# Patient Record
Sex: MALE | Race: WHITE | Employment: UNEMPLOYED | URBAN - METROPOLITAN AREA
[De-identification: names, ages, dates, MRNs, and addresses within clinical notes are randomized per-mention and may not be internally consistent; named-entity substitution may affect disease eponyms.]

---

## 2020-10-05 ENCOUNTER — HOSPITAL ENCOUNTER (INPATIENT)
Age: 35
LOS: 1 days | Discharge: PSYCHIATRIC OR SUBSTANCE USE FACILITY | DRG: 201 | End: 2020-10-07
Attending: EMERGENCY MEDICINE | Admitting: INTERNAL MEDICINE
Payer: COMMERCIAL

## 2020-10-05 LAB
ACETAMINOPHEN LEVEL: <5 UG/ML (ref 10–30)
ALBUMIN SERPL-MCNC: 4.6 G/DL (ref 3.5–4.6)
ALP BLD-CCNC: 82 U/L (ref 35–104)
ALT SERPL-CCNC: 141 U/L (ref 0–41)
AMPHETAMINE SCREEN, URINE: ABNORMAL
ANION GAP SERPL CALCULATED.3IONS-SCNC: 11 MEQ/L (ref 9–15)
AST SERPL-CCNC: 267 U/L (ref 0–40)
BARBITURATE SCREEN URINE: ABNORMAL
BASOPHILS ABSOLUTE: 0 K/UL (ref 0–0.2)
BASOPHILS RELATIVE PERCENT: 0.6 %
BENZODIAZEPINE SCREEN, URINE: ABNORMAL
BILIRUB SERPL-MCNC: 0.9 MG/DL (ref 0.2–0.7)
BILIRUBIN URINE: ABNORMAL
BLOOD, URINE: NEGATIVE
BUN BLDV-MCNC: 9 MG/DL (ref 6–20)
CALCIUM SERPL-MCNC: 9.2 MG/DL (ref 8.5–9.9)
CANNABINOID SCREEN URINE: ABNORMAL
CHLORIDE BLD-SCNC: 101 MEQ/L (ref 95–107)
CHOLESTEROL, TOTAL: 185 MG/DL (ref 0–199)
CK MB: 7.4 NG/ML (ref 0–6.7)
CLARITY: CLEAR
CO2: 25 MEQ/L (ref 20–31)
COCAINE METABOLITE SCREEN URINE: POSITIVE
COLOR: ABNORMAL
CREAT SERPL-MCNC: 0.68 MG/DL (ref 0.7–1.2)
CREATINE KINASE-MB INDEX: 0.8 % (ref 0–3.5)
EOSINOPHILS ABSOLUTE: 0 K/UL (ref 0–0.7)
EOSINOPHILS RELATIVE PERCENT: 0.5 %
ETHANOL PERCENT: 0.07 G/DL
ETHANOL: 81 MG/DL (ref 0–0.08)
GFR AFRICAN AMERICAN: >60
GFR NON-AFRICAN AMERICAN: >60
GLOBULIN: 3.4 G/DL (ref 2.3–3.5)
GLUCOSE BLD-MCNC: 101 MG/DL (ref 70–99)
GLUCOSE URINE: NEGATIVE MG/DL
HCT VFR BLD CALC: 44.3 % (ref 42–52)
HDLC SERPL-MCNC: 64 MG/DL (ref 40–59)
HEMOGLOBIN: 14.8 G/DL (ref 14–18)
KETONES, URINE: ABNORMAL MG/DL
LDL CHOLESTEROL CALCULATED: 100 MG/DL (ref 0–129)
LEUKOCYTE ESTERASE, URINE: NEGATIVE
LYMPHOCYTES ABSOLUTE: 2.3 K/UL (ref 1–4.8)
LYMPHOCYTES RELATIVE PERCENT: 36.1 %
Lab: ABNORMAL
MCH RBC QN AUTO: 33.2 PG (ref 27–31.3)
MCHC RBC AUTO-ENTMCNC: 33.5 % (ref 33–37)
MCV RBC AUTO: 99 FL (ref 80–100)
METHADONE SCREEN, URINE: ABNORMAL
MONOCYTES ABSOLUTE: 0.6 K/UL (ref 0.2–0.8)
MONOCYTES RELATIVE PERCENT: 9.7 %
NEUTROPHILS ABSOLUTE: 3.4 K/UL (ref 1.4–6.5)
NEUTROPHILS RELATIVE PERCENT: 53.1 %
NITRITE, URINE: NEGATIVE
OPIATE SCREEN URINE: ABNORMAL
OXYCODONE URINE: ABNORMAL
PDW BLD-RTO: 13.9 % (ref 11.5–14.5)
PH UA: 6 (ref 5–9)
PHENCYCLIDINE SCREEN URINE: ABNORMAL
PLATELET # BLD: 322 K/UL (ref 130–400)
POTASSIUM SERPL-SCNC: 4.2 MEQ/L (ref 3.4–4.9)
PROPOXYPHENE SCREEN: ABNORMAL
PROTEIN UA: ABNORMAL MG/DL
RBC # BLD: 4.48 M/UL (ref 4.7–6.1)
SALICYLATE, SERUM: <0.3 MG/DL (ref 15–30)
SODIUM BLD-SCNC: 137 MEQ/L (ref 135–144)
SPECIFIC GRAVITY UA: 1.02 (ref 1–1.03)
TOTAL CK: 899 U/L (ref 0–190)
TOTAL PROTEIN: 8 G/DL (ref 6.3–8)
TRIGL SERPL-MCNC: 104 MG/DL (ref 0–150)
TSH SERPL DL<=0.05 MIU/L-ACNC: 1.89 UIU/ML (ref 0.44–3.86)
URINE REFLEX TO CULTURE: ABNORMAL
UROBILINOGEN, URINE: 1 E.U./DL
WBC # BLD: 6.4 K/UL (ref 4.8–10.8)

## 2020-10-05 PROCEDURE — 36415 COLL VENOUS BLD VENIPUNCTURE: CPT

## 2020-10-05 PROCEDURE — 81003 URINALYSIS AUTO W/O SCOPE: CPT

## 2020-10-05 PROCEDURE — G0480 DRUG TEST DEF 1-7 CLASSES: HCPCS

## 2020-10-05 PROCEDURE — 80061 LIPID PANEL: CPT

## 2020-10-05 PROCEDURE — 80307 DRUG TEST PRSMV CHEM ANLYZR: CPT

## 2020-10-05 PROCEDURE — 85025 COMPLETE CBC W/AUTO DIFF WBC: CPT

## 2020-10-05 PROCEDURE — 99285 EMERGENCY DEPT VISIT HI MDM: CPT

## 2020-10-05 PROCEDURE — 84443 ASSAY THYROID STIM HORMONE: CPT

## 2020-10-05 PROCEDURE — 93005 ELECTROCARDIOGRAM TRACING: CPT | Performed by: PHYSICIAN ASSISTANT

## 2020-10-05 PROCEDURE — 6370000000 HC RX 637 (ALT 250 FOR IP): Performed by: EMERGENCY MEDICINE

## 2020-10-05 PROCEDURE — 82550 ASSAY OF CK (CPK): CPT

## 2020-10-05 PROCEDURE — 82553 CREATINE MB FRACTION: CPT

## 2020-10-05 PROCEDURE — 80053 COMPREHEN METABOLIC PANEL: CPT

## 2020-10-05 RX ORDER — PRAZOSIN HYDROCHLORIDE 2 MG/1
2 CAPSULE ORAL NIGHTLY
Status: ON HOLD | COMMUNITY
End: 2020-10-14 | Stop reason: HOSPADM

## 2020-10-05 RX ORDER — LORAZEPAM 1 MG/1
2 TABLET ORAL ONCE
Status: COMPLETED | OUTPATIENT
Start: 2020-10-05 | End: 2020-10-05

## 2020-10-05 RX ORDER — GABAPENTIN 600 MG/1
400 TABLET ORAL 3 TIMES DAILY
Status: ON HOLD | COMMUNITY
End: 2022-01-20 | Stop reason: HOSPADM

## 2020-10-05 RX ORDER — BENZTROPINE MESYLATE 0.5 MG/1
0.5 TABLET ORAL DAILY
Status: ON HOLD | COMMUNITY
End: 2020-10-06

## 2020-10-05 RX ADMIN — LORAZEPAM 2 MG: 1 TABLET ORAL at 21:58

## 2020-10-05 ASSESSMENT — PAIN DESCRIPTION - FREQUENCY: FREQUENCY: CONTINUOUS

## 2020-10-05 ASSESSMENT — ENCOUNTER SYMPTOMS
DIARRHEA: 0
SHORTNESS OF BREATH: 0
BACK PAIN: 0
COUGH: 0
STRIDOR: 0
WHEEZING: 0
VOMITING: 0
ABDOMINAL PAIN: 0
NAUSEA: 0

## 2020-10-05 ASSESSMENT — PAIN DESCRIPTION - DESCRIPTORS: DESCRIPTORS: ACHING

## 2020-10-05 ASSESSMENT — PAIN DESCRIPTION - PAIN TYPE: TYPE: CHRONIC PAIN

## 2020-10-05 ASSESSMENT — PAIN DESCRIPTION - LOCATION: LOCATION: BACK

## 2020-10-05 ASSESSMENT — PAIN SCALES - GENERAL: PAINLEVEL_OUTOF10: 6

## 2020-10-05 ASSESSMENT — PATIENT HEALTH QUESTIONNAIRE - PHQ9: SUM OF ALL RESPONSES TO PHQ QUESTIONS 1-9: 27

## 2020-10-05 NOTE — ED PROVIDER NOTES
confusion, hallucinations and self-injury. The patient is not nervous/anxious. All other systems reviewed and are negative. Except as noted above the remainder of the review of systems was reviewed and negative. PAST MEDICAL HISTORY     Past Medical History:   Diagnosis Date    Neuropathy          SURGICALHISTORY     No past surgical history on file. CURRENT MEDICATIONS       Previous Medications    BENZTROPINE (COGENTIN) 0.5 MG TABLET    Take 0.5 mg by mouth daily    CARIPRAZINE HCL (VRAYLAR) 1.5 MG CAPSULE    Take 1.5 mg by mouth daily    GABAPENTIN (NEURONTIN) 600 MG TABLET    Take 600 mg by mouth 3 times daily. PRAZOSIN (MINIPRESS) 2 MG CAPSULE    Take 2 mg by mouth nightly       ALLERGIES     Quetiapine and Penicillins    FAMILY HISTORY     No family history on file.        SOCIAL HISTORY       Social History     Socioeconomic History    Marital status: Single     Spouse name: Not on file    Number of children: Not on file    Years of education: Not on file    Highest education level: Not on file   Occupational History    Not on file   Social Needs    Financial resource strain: Not on file    Food insecurity     Worry: Not on file     Inability: Not on file    Transportation needs     Medical: Not on file     Non-medical: Not on file   Tobacco Use    Smoking status: Current Some Day Smoker     Types: Cigarettes    Smokeless tobacco: Never Used   Substance and Sexual Activity    Alcohol use: Yes     Comment: A 5th a day    Drug use: Not on file    Sexual activity: Not on file   Lifestyle    Physical activity     Days per week: Not on file     Minutes per session: Not on file    Stress: Not on file   Relationships    Social connections     Talks on phone: Not on file     Gets together: Not on file     Attends Cheondoism service: Not on file     Active member of club or organization: Not on file     Attends meetings of clubs or organizations: Not on file     Relationship status: Not on file    Intimate partner violence     Fear of current or ex partner: Not on file     Emotionally abused: Not on file     Physically abused: Not on file     Forced sexual activity: Not on file   Other Topics Concern    Not on file   Social History Narrative    Not on file       SCREENINGS      @MDED([id number])@      PHYSICAL EXAM    (up to 7 for level 4, 8 or more for level 5)     ED Triage Vitals [10/05/20 1911]   BP Temp Temp Source Pulse Resp SpO2 Height Weight   122/82 98.3 °F (36.8 °C) Temporal 87 18 96 % 5' 11\" (1.803 m) 160 lb (72.6 kg)       Physical Exam  Constitutional:       Appearance: He is well-developed. HENT:      Head: Normocephalic and atraumatic. Nose: Nose normal.      Mouth/Throat:      Mouth: Mucous membranes are moist.   Eyes:      Extraocular Movements: Extraocular movements intact. Conjunctiva/sclera: Conjunctivae normal.      Pupils: Pupils are equal, round, and reactive to light. Neck:      Musculoskeletal: Normal range of motion and neck supple. Cardiovascular:      Rate and Rhythm: Normal rate and regular rhythm. Heart sounds: Normal heart sounds. No murmur. Pulmonary:      Effort: Pulmonary effort is normal. No respiratory distress. Breath sounds: Normal breath sounds. No wheezing. Abdominal:      General: There is no distension. Palpations: Abdomen is soft. Tenderness: There is no abdominal tenderness. Musculoskeletal: Normal range of motion. General: No tenderness. Skin:     General: Skin is warm. Findings: No rash. Neurological:      General: No focal deficit present. Mental Status: He is alert and oriented to person, place, and time. Comments: Grossly unremarkable without lateralizing signs. Normal strength and sensation.   GCS 15.   Psychiatric:         Behavior: Behavior normal.         DIAGNOSTIC RESULTS     EKG: All EKG's are interpreted by the Emergency Department Physician who either signs or Co-signsthis chart in the absence of a cardiologist.    EKG#1  shows sinus rhythm with a rate of 73, nonspecific ST and T wave changes, no acute ischemic changes or arrhythmia. EKG #2: Sinus rhythm at 65 bpm, T wave inversions in the inferior anterior lateral leads, diffuse low voltage, QT intervals 464 ms, QTC is 482 ms, no PVCs. Similar appearance to prior EKG done today. EKG 3: Sinus bradycardia 51 bpm, T wave inversions in the inferior and anterior lateral leads, QT interval is 558 ms, QTC is 514 ms, no PVCs. EKG #4: Sinus bradycardia at 42 bpm, T wave inversions in the inferior, and anterior lateral leads, QT interval of 558 ms, QTC of 465 ms, no PVCs. RADIOLOGY:   Non-plain filmimages such as CT, Ultrasound and MRI are read by the radiologist. Plain radiographic images are visualized and preliminarily interpreted by the emergency physician with the below findings:    n/a    Interpretation per the Radiologist below, if available at the time ofthis note:    No orders to display         ED BEDSIDE ULTRASOUND:   Performed by ED Physician - none    LABS:  Labs Reviewed   ACETAMINOPHEN LEVEL - Abnormal; Notable for the following components:       Result Value    Acetaminophen Level <5 (*)     All other components within normal limits   CBC WITH AUTO DIFFERENTIAL - Abnormal; Notable for the following components:    RBC 4.48 (*)     MCH 33.2 (*)     All other components within normal limits   CK - Abnormal; Notable for the following components:     Total  (*)     All other components within normal limits   COMPREHENSIVE METABOLIC PANEL - Abnormal; Notable for the following components:    Glucose 101 (*)     CREATININE 0.68 (*)     Total Bilirubin 0.9 (*)      (*)      (*)     All other components within normal limits   LIPID PANEL - Abnormal; Notable for the following components:    HDL 64 (*)     All other components within normal limits   SALICYLATE LEVEL - Abnormal; Notable for the following components:    Salicylate, Serum <2.0 (*)     All other components within normal limits   URINE DRUG SCREEN - Abnormal; Notable for the following components:    Cocaine Metabolite Screen, Urine POSITIVE (*)     All other components within normal limits   URINE RT REFLEX TO CULTURE - Abnormal; Notable for the following components:    Color, UA DARK YELLOW (*)     Bilirubin Urine SMALL (*)     Ketones, Urine TRACE (*)     Protein, UA TRACE (*)     All other components within normal limits   CKMB & RELATIVE PERCENT - Abnormal; Notable for the following components:    CK-MB 7.4 (*)     All other components within normal limits   HEPATIC FUNCTION PANEL - Abnormal; Notable for the following components:     (*)      (*)     All other components within normal limits   CK - Abnormal; Notable for the following components: Total  (*)     All other components within normal limits   CK - Abnormal; Notable for the following components: Total  (*)     All other components within normal limits   BASIC METABOLIC PANEL - Abnormal; Notable for the following components:    Anion Gap 8 (*)     Glucose 114 (*)     CREATININE 0.66 (*)     All other components within normal limits   ETHANOL   TSH WITHOUT REFLEX   CKMB & RELATIVE PERCENT   COVID-19   TROPONIN   MAGNESIUM   CKMB & RELATIVE PERCENT       All other labs were within normal range or not returned as of this dictation. EMERGENCY DEPARTMENT COURSE and DIFFERENTIAL DIAGNOSIS/MDM:   Vitals:    Vitals:    10/06/20 1153 10/06/20 1218 10/06/20 1404 10/06/20 1407   BP: 123/82 114/64 122/76 122/76   Pulse: (!) 45 (!) 46 57 57   Resp: 16      Temp:       TempSrc:       SpO2:       Weight:       Height:           MDM  Number of Diagnoses or Management Options  Diagnosis management comments: Medical screening evaluation was ordered. Blood work overall unremarkable other than slightly elevated CK.   EKG shows sinus rhythm with a rate of 73,

## 2020-10-06 PROBLEM — R94.31 QT PROLONGATION: Status: ACTIVE | Noted: 2020-10-06

## 2020-10-06 LAB
ALBUMIN SERPL-MCNC: 4.1 G/DL (ref 3.5–4.6)
ALP BLD-CCNC: 73 U/L (ref 35–104)
ALT SERPL-CCNC: 112 U/L (ref 0–41)
ANION GAP SERPL CALCULATED.3IONS-SCNC: 8 MEQ/L (ref 9–15)
AST SERPL-CCNC: 176 U/L (ref 0–40)
BASOPHILS ABSOLUTE: 0 K/UL (ref 0–0.2)
BASOPHILS RELATIVE PERCENT: 0.5 %
BILIRUB SERPL-MCNC: 0.7 MG/DL (ref 0.2–0.7)
BILIRUBIN DIRECT: <0.2 MG/DL (ref 0–0.4)
BILIRUBIN, INDIRECT: ABNORMAL MG/DL (ref 0–0.6)
BUN BLDV-MCNC: 10 MG/DL (ref 6–20)
CALCIUM SERPL-MCNC: 9.3 MG/DL (ref 8.5–9.9)
CHLORIDE BLD-SCNC: 102 MEQ/L (ref 95–107)
CK MB: 2.7 NG/ML (ref 0–6.7)
CK MB: 4.7 NG/ML (ref 0–6.7)
CO2: 29 MEQ/L (ref 20–31)
CREAT SERPL-MCNC: 0.66 MG/DL (ref 0.7–1.2)
CREATINE KINASE-MB INDEX: 0.6 % (ref 0–3.5)
CREATINE KINASE-MB INDEX: 0.8 % (ref 0–3.5)
EKG ATRIAL RATE: 42 BPM
EKG ATRIAL RATE: 51 BPM
EKG ATRIAL RATE: 56 BPM
EKG ATRIAL RATE: 65 BPM
EKG ATRIAL RATE: 73 BPM
EKG P AXIS: 52 DEGREES
EKG P AXIS: 57 DEGREES
EKG P AXIS: 57 DEGREES
EKG P AXIS: 59 DEGREES
EKG P AXIS: 61 DEGREES
EKG P-R INTERVAL: 144 MS
EKG P-R INTERVAL: 146 MS
EKG P-R INTERVAL: 146 MS
EKG P-R INTERVAL: 150 MS
EKG P-R INTERVAL: 154 MS
EKG Q-T INTERVAL: 446 MS
EKG Q-T INTERVAL: 454 MS
EKG Q-T INTERVAL: 464 MS
EKG Q-T INTERVAL: 558 MS
EKG Q-T INTERVAL: 558 MS
EKG QRS DURATION: 100 MS
EKG QRS DURATION: 102 MS
EKG QRS DURATION: 108 MS
EKG QRS DURATION: 94 MS
EKG QRS DURATION: 98 MS
EKG QTC CALCULATION (BAZETT): 430 MS
EKG QTC CALCULATION (BAZETT): 465 MS
EKG QTC CALCULATION (BAZETT): 482 MS
EKG QTC CALCULATION (BAZETT): 500 MS
EKG QTC CALCULATION (BAZETT): 514 MS
EKG R AXIS: -68 DEGREES
EKG R AXIS: -71 DEGREES
EKG R AXIS: -76 DEGREES
EKG R AXIS: 256 DEGREES
EKG R AXIS: 265 DEGREES
EKG T AXIS: -13 DEGREES
EKG T AXIS: -72 DEGREES
EKG T AXIS: -83 DEGREES
EKG T AXIS: 23 DEGREES
EKG T AXIS: 59 DEGREES
EKG VENTRICULAR RATE: 42 BPM
EKG VENTRICULAR RATE: 51 BPM
EKG VENTRICULAR RATE: 56 BPM
EKG VENTRICULAR RATE: 65 BPM
EKG VENTRICULAR RATE: 73 BPM
EOSINOPHILS ABSOLUTE: 0 K/UL (ref 0–0.7)
EOSINOPHILS RELATIVE PERCENT: 0.2 %
GFR AFRICAN AMERICAN: >60
GFR NON-AFRICAN AMERICAN: >60
GLUCOSE BLD-MCNC: 114 MG/DL (ref 70–99)
HCT VFR BLD CALC: 43.6 % (ref 42–52)
HEMOGLOBIN: 14.6 G/DL (ref 14–18)
HEPATITIS C ANTIBODY INTERPRETATION: REACTIVE
LYMPHOCYTES ABSOLUTE: 1.1 K/UL (ref 1–4.8)
LYMPHOCYTES RELATIVE PERCENT: 21.7 %
MAGNESIUM: 1.8 MG/DL (ref 1.7–2.4)
MCH RBC QN AUTO: 33.3 PG (ref 27–31.3)
MCHC RBC AUTO-ENTMCNC: 33.4 % (ref 33–37)
MCV RBC AUTO: 99.9 FL (ref 80–100)
MONOCYTES ABSOLUTE: 0.5 K/UL (ref 0.2–0.8)
MONOCYTES RELATIVE PERCENT: 9.6 %
NEUTROPHILS ABSOLUTE: 3.5 K/UL (ref 1.4–6.5)
NEUTROPHILS RELATIVE PERCENT: 68 %
PDW BLD-RTO: 13.7 % (ref 11.5–14.5)
PLATELET # BLD: 289 K/UL (ref 130–400)
POTASSIUM SERPL-SCNC: 4.1 MEQ/L (ref 3.4–4.9)
RBC # BLD: 4.37 M/UL (ref 4.7–6.1)
SARS-COV-2, NAAT: NOT DETECTED
SODIUM BLD-SCNC: 139 MEQ/L (ref 135–144)
TOTAL CK: 420 U/L (ref 0–190)
TOTAL CK: 621 U/L (ref 0–190)
TOTAL PROTEIN: 6.9 G/DL (ref 6.3–8)
TROPONIN: <0.01 NG/ML (ref 0–0.01)
WBC # BLD: 5.1 K/UL (ref 4.8–10.8)

## 2020-10-06 PROCEDURE — 86803 HEPATITIS C AB TEST: CPT

## 2020-10-06 PROCEDURE — 82553 CREATINE MB FRACTION: CPT

## 2020-10-06 PROCEDURE — 99255 IP/OBS CONSLTJ NEW/EST HI 80: CPT | Performed by: INTERNAL MEDICINE

## 2020-10-06 PROCEDURE — 93005 ELECTROCARDIOGRAM TRACING: CPT | Performed by: STUDENT IN AN ORGANIZED HEALTH CARE EDUCATION/TRAINING PROGRAM

## 2020-10-06 PROCEDURE — 36415 COLL VENOUS BLD VENIPUNCTURE: CPT

## 2020-10-06 PROCEDURE — 6370000000 HC RX 637 (ALT 250 FOR IP): Performed by: INTERNAL MEDICINE

## 2020-10-06 PROCEDURE — 93010 ELECTROCARDIOGRAM REPORT: CPT | Performed by: INTERNAL MEDICINE

## 2020-10-06 PROCEDURE — 80048 BASIC METABOLIC PNL TOTAL CA: CPT

## 2020-10-06 PROCEDURE — 83735 ASSAY OF MAGNESIUM: CPT

## 2020-10-06 PROCEDURE — 84484 ASSAY OF TROPONIN QUANT: CPT

## 2020-10-06 PROCEDURE — 6360000002 HC RX W HCPCS: Performed by: INTERNAL MEDICINE

## 2020-10-06 PROCEDURE — G0378 HOSPITAL OBSERVATION PER HR: HCPCS

## 2020-10-06 PROCEDURE — 1210000000 HC MED SURG R&B

## 2020-10-06 PROCEDURE — 85025 COMPLETE CBC W/AUTO DIFF WBC: CPT

## 2020-10-06 PROCEDURE — 6370000000 HC RX 637 (ALT 250 FOR IP): Performed by: STUDENT IN AN ORGANIZED HEALTH CARE EDUCATION/TRAINING PROGRAM

## 2020-10-06 PROCEDURE — 2580000003 HC RX 258: Performed by: INTERNAL MEDICINE

## 2020-10-06 PROCEDURE — 80076 HEPATIC FUNCTION PANEL: CPT

## 2020-10-06 PROCEDURE — U0002 COVID-19 LAB TEST NON-CDC: HCPCS

## 2020-10-06 PROCEDURE — 82550 ASSAY OF CK (CPK): CPT

## 2020-10-06 RX ORDER — SODIUM CHLORIDE 0.9 % (FLUSH) 0.9 %
10 SYRINGE (ML) INJECTION EVERY 12 HOURS SCHEDULED
Status: DISCONTINUED | OUTPATIENT
Start: 2020-10-06 | End: 2020-10-07 | Stop reason: HOSPADM

## 2020-10-06 RX ORDER — ACETAMINOPHEN 325 MG/1
650 TABLET ORAL EVERY 6 HOURS PRN
Status: DISCONTINUED | OUTPATIENT
Start: 2020-10-06 | End: 2020-10-07 | Stop reason: HOSPADM

## 2020-10-06 RX ORDER — TRAMADOL HYDROCHLORIDE 50 MG/1
50 TABLET ORAL ONCE
Status: COMPLETED | OUTPATIENT
Start: 2020-10-06 | End: 2020-10-06

## 2020-10-06 RX ORDER — LORAZEPAM 2 MG/ML
2 INJECTION INTRAMUSCULAR
Status: DISCONTINUED | OUTPATIENT
Start: 2020-10-06 | End: 2020-10-07 | Stop reason: HOSPADM

## 2020-10-06 RX ORDER — LORAZEPAM 1 MG/1
2 TABLET ORAL
Status: DISCONTINUED | OUTPATIENT
Start: 2020-10-06 | End: 2020-10-07 | Stop reason: HOSPADM

## 2020-10-06 RX ORDER — LORAZEPAM 1 MG/1
3 TABLET ORAL
Status: DISCONTINUED | OUTPATIENT
Start: 2020-10-06 | End: 2020-10-07 | Stop reason: HOSPADM

## 2020-10-06 RX ORDER — LORAZEPAM 2 MG/ML
3 INJECTION INTRAMUSCULAR
Status: DISCONTINUED | OUTPATIENT
Start: 2020-10-06 | End: 2020-10-07 | Stop reason: HOSPADM

## 2020-10-06 RX ORDER — LORAZEPAM 2 MG/ML
1 INJECTION INTRAMUSCULAR
Status: DISCONTINUED | OUTPATIENT
Start: 2020-10-06 | End: 2020-10-07 | Stop reason: HOSPADM

## 2020-10-06 RX ORDER — ONDANSETRON 2 MG/ML
4 INJECTION INTRAMUSCULAR; INTRAVENOUS EVERY 6 HOURS PRN
Status: DISCONTINUED | OUTPATIENT
Start: 2020-10-06 | End: 2020-10-07 | Stop reason: HOSPADM

## 2020-10-06 RX ORDER — LORAZEPAM 1 MG/1
1 TABLET ORAL
Status: DISCONTINUED | OUTPATIENT
Start: 2020-10-06 | End: 2020-10-07 | Stop reason: HOSPADM

## 2020-10-06 RX ORDER — GABAPENTIN 300 MG/1
600 CAPSULE ORAL 3 TIMES DAILY
Status: DISCONTINUED | OUTPATIENT
Start: 2020-10-06 | End: 2020-10-07 | Stop reason: HOSPADM

## 2020-10-06 RX ORDER — LORAZEPAM 2 MG/ML
4 INJECTION INTRAMUSCULAR
Status: DISCONTINUED | OUTPATIENT
Start: 2020-10-06 | End: 2020-10-07 | Stop reason: HOSPADM

## 2020-10-06 RX ORDER — PROMETHAZINE HYDROCHLORIDE 12.5 MG/1
12.5 TABLET ORAL EVERY 6 HOURS PRN
Status: DISCONTINUED | OUTPATIENT
Start: 2020-10-06 | End: 2020-10-07 | Stop reason: HOSPADM

## 2020-10-06 RX ORDER — LORAZEPAM 1 MG/1
2 TABLET ORAL ONCE
Status: DISCONTINUED | OUTPATIENT
Start: 2020-10-06 | End: 2020-10-06

## 2020-10-06 RX ORDER — KETOROLAC TROMETHAMINE 30 MG/ML
30 INJECTION, SOLUTION INTRAMUSCULAR; INTRAVENOUS EVERY 6 HOURS PRN
Status: DISCONTINUED | OUTPATIENT
Start: 2020-10-06 | End: 2020-10-07 | Stop reason: HOSPADM

## 2020-10-06 RX ORDER — SODIUM CHLORIDE 0.9 % (FLUSH) 0.9 %
10 SYRINGE (ML) INJECTION PRN
Status: DISCONTINUED | OUTPATIENT
Start: 2020-10-06 | End: 2020-10-07 | Stop reason: HOSPADM

## 2020-10-06 RX ORDER — LORAZEPAM 1 MG/1
4 TABLET ORAL
Status: DISCONTINUED | OUTPATIENT
Start: 2020-10-06 | End: 2020-10-07 | Stop reason: HOSPADM

## 2020-10-06 RX ORDER — ACETAMINOPHEN 650 MG/1
650 SUPPOSITORY RECTAL EVERY 6 HOURS PRN
Status: DISCONTINUED | OUTPATIENT
Start: 2020-10-06 | End: 2020-10-07 | Stop reason: HOSPADM

## 2020-10-06 RX ORDER — SODIUM CHLORIDE 9 MG/ML
INJECTION, SOLUTION INTRAVENOUS CONTINUOUS
Status: DISCONTINUED | OUTPATIENT
Start: 2020-10-06 | End: 2020-10-07 | Stop reason: HOSPADM

## 2020-10-06 RX ORDER — POLYETHYLENE GLYCOL 3350 17 G/17G
17 POWDER, FOR SOLUTION ORAL DAILY PRN
Status: DISCONTINUED | OUTPATIENT
Start: 2020-10-06 | End: 2020-10-07 | Stop reason: HOSPADM

## 2020-10-06 RX ORDER — LANOLIN ALCOHOL/MO/W.PET/CERES
400 CREAM (GRAM) TOPICAL 2 TIMES DAILY
Status: DISCONTINUED | OUTPATIENT
Start: 2020-10-06 | End: 2020-10-07 | Stop reason: HOSPADM

## 2020-10-06 RX ADMIN — Medication 10 ML: at 21:29

## 2020-10-06 RX ADMIN — Medication 400 MG: at 21:29

## 2020-10-06 RX ADMIN — GABAPENTIN 600 MG: 300 CAPSULE ORAL at 21:29

## 2020-10-06 RX ADMIN — LORAZEPAM 2 MG: 2 INJECTION INTRAMUSCULAR; INTRAVENOUS at 18:33

## 2020-10-06 RX ADMIN — TRAMADOL HYDROCHLORIDE 50 MG: 50 TABLET, FILM COATED ORAL at 14:55

## 2020-10-06 RX ADMIN — SODIUM CHLORIDE: 9 INJECTION, SOLUTION INTRAVENOUS at 18:38

## 2020-10-06 RX ADMIN — LORAZEPAM 2 MG: 2 INJECTION INTRAMUSCULAR; INTRAVENOUS at 21:36

## 2020-10-06 ASSESSMENT — ENCOUNTER SYMPTOMS
GASTROINTESTINAL NEGATIVE: 1
COLOR CHANGE: 0
SHORTNESS OF BREATH: 0
RESPIRATORY NEGATIVE: 1
BLOOD IN STOOL: 0
ABDOMINAL DISTENTION: 0
APNEA: 0
EYE DISCHARGE: 0
EYES NEGATIVE: 1
COUGH: 0
NAUSEA: 0
CHEST TIGHTNESS: 0
WHEEZING: 0
STRIDOR: 0

## 2020-10-06 ASSESSMENT — PAIN SCALES - GENERAL: PAINLEVEL_OUTOF10: 6

## 2020-10-06 NOTE — H&P
History    Marital status: Single     Spouse name: Not on file    Number of children: Not on file    Years of education: Not on file    Highest education level: Not on file   Occupational History    Not on file   Social Needs    Financial resource strain: Not on file    Food insecurity     Worry: Not on file     Inability: Not on file    Transportation needs     Medical: Not on file     Non-medical: Not on file   Tobacco Use    Smoking status: Current Some Day Smoker     Types: Cigarettes    Smokeless tobacco: Never Used   Substance and Sexual Activity    Alcohol use: Yes     Comment: A 5th a day    Drug use: Not on file    Sexual activity: Not on file   Lifestyle    Physical activity     Days per week: Not on file     Minutes per session: Not on file    Stress: Not on file   Relationships    Social connections     Talks on phone: Not on file     Gets together: Not on file     Attends Spiritism service: Not on file     Active member of club or organization: Not on file     Attends meetings of clubs or organizations: Not on file     Relationship status: Not on file    Intimate partner violence     Fear of current or ex partner: Not on file     Emotionally abused: Not on file     Physically abused: Not on file     Forced sexual activity: Not on file   Other Topics Concern    Not on file   Social History Narrative    Not on file     History reviewed. No pertinent surgical history. Review of Systems   Constitutional: Negative for activity change and appetite change. HENT: Negative for congestion. Eyes: Negative for discharge. Respiratory: Negative for apnea. Gastrointestinal: Negative for abdominal distention. Endocrine: Negative for cold intolerance. Genitourinary: Negative for difficulty urinating. Musculoskeletal: Negative for arthralgias. Skin: Negative for color change. Allergic/Immunologic: Negative for environmental allergies. Neurological: Positive for tremors. Hematological: Negative for adenopathy. Psychiatric/Behavioral: Negative for agitation. Physical Exam  Constitutional:       Appearance: Normal appearance. He is not ill-appearing. HENT:      Head: Normocephalic and atraumatic. Mouth/Throat:      Pharynx: No oropharyngeal exudate or posterior oropharyngeal erythema. Eyes:      General:         Left eye: No discharge. Pupils: Pupils are equal, round, and reactive to light. Neck:      Musculoskeletal: No neck rigidity. Cardiovascular:      Rate and Rhythm: Normal rate. Heart sounds: No murmur. Pulmonary:      Effort: No respiratory distress. Breath sounds: No stridor. Abdominal:      General: There is no distension. Neurological:      Cranial Nerves: No cranial nerve deficit.        Lab Results   Component Value Date    WBC 6.4 10/05/2020    HGB 14.8 10/05/2020    HCT 44.3 10/05/2020    MCV 99.0 10/05/2020     10/05/2020     Lab Results   Component Value Date     10/06/2020    K 4.1 10/06/2020     10/06/2020    CO2 29 10/06/2020    BUN 10 10/06/2020    CREATININE 0.66 10/06/2020    GLUCOSE 114 10/06/2020    CALCIUM 9.3 10/06/2020            1) QT prolongation  2) EToh abuse with pending withdrawal  3) cocaine abuse  4) suicidal idelation  Admit to cardiac floor  C/w CIWA prtocol  FU cardiology  One on one  Counseling given  Psychiatric eval  echo

## 2020-10-06 NOTE — CONSULTS
Consults    Patient Name: Abiola Todd  Admit Date: 10/5/2020  7:13 PM  MR #: [id number]  : 1985    Attending Physician: Elva Chung MD  Reason for consult: Prolonged QTc    History of Presenting Illness:      Abiola Todd is a 38 y.o. male on hospital day 0 with a history of . History Obtained From:  patient, electronic medical record    Pt presents to ER with Suicidal ideation. He has h/o multisubstance abuse. He tested + for cocaine. He admits suing Cocaine over the weekend. He was off ETOH but started back up a month ago. He uses Opiates and Over dosed on Imodium to get 'high.'     Had 4 ECG in the ER. Last one QTc 514 ms  History:      EKG:SR  Past Medical History:   Diagnosis Date    Neuropathy      No past surgical history on file. Family History  No family history on file.   [] Unable to obtain due to ventilated and/ or neurologic status    Social History     Socioeconomic History    Marital status: Single     Spouse name: Not on file    Number of children: Not on file    Years of education: Not on file    Highest education level: Not on file   Occupational History    Not on file   Social Needs    Financial resource strain: Not on file    Food insecurity     Worry: Not on file     Inability: Not on file    Transportation needs     Medical: Not on file     Non-medical: Not on file   Tobacco Use    Smoking status: Current Some Day Smoker     Types: Cigarettes    Smokeless tobacco: Never Used   Substance and Sexual Activity    Alcohol use: Yes     Comment: A 5th a day    Drug use: Not on file    Sexual activity: Not on file   Lifestyle    Physical activity     Days per week: Not on file     Minutes per session: Not on file    Stress: Not on file   Relationships    Social connections     Talks on phone: Not on file     Gets together: Not on file     Attends Islam service: Not on file     Active member of club or organization: Not on file     Attends meetings of clubs or organizations: Not on file     Relationship status: Not on file    Intimate partner violence     Fear of current or ex partner: Not on file     Emotionally abused: Not on file     Physically abused: Not on file     Forced sexual activity: Not on file   Other Topics Concern    Not on file   Social History Narrative    Not on file      [] Unable to obtain due to ventilated and/ or neurologic status      Home Medications:      Not in a hospital admission. Current Hospital Medications:     Scheduled Meds:   magnesium oxide  400 mg Oral BID     Continuous Infusions:  PRN Meds:. .       Allergies: Allergies   Allergen Reactions    Quetiapine Other (See Comments) and Anaphylaxis     Throat Spasms      Penicillins         Review of Systems:       Review of Systems   Constitutional: Negative. Negative for diaphoresis and fatigue. HENT: Negative. Eyes: Negative. Respiratory: Negative. Negative for cough, chest tightness, shortness of breath, wheezing and stridor. Cardiovascular: Negative. Negative for chest pain, palpitations and leg swelling. Gastrointestinal: Negative. Negative for blood in stool and nausea. Genitourinary: Negative. Musculoskeletal: Negative. Skin: Negative. Neurological: Negative. Negative for dizziness, syncope, weakness and light-headedness. Hematological: Negative. Psychiatric/Behavioral: Positive for suicidal ideas. Objective Findings:     Vitals:/83   Pulse 62   Temp 98.9 °F (37.2 °C) (Oral)   Resp 16   Ht 5' 11\" (1.803 m)   Wt 160 lb (72.6 kg)   SpO2 98%   BMI 22.32 kg/m²      Physical Examination:    Physical Exam   Constitutional: He appears healthy. No distress. HENT:   Normal cephalic and Atraumatic   Eyes: Pupils are equal, round, and reactive to light. Neck: Normal range of motion and thyroid normal. Neck supple. No JVD present. No neck adenopathy. No thyromegaly present.    Cardiovascular: Normal rate, regular rhythm,
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
## 2020-10-07 ENCOUNTER — HOSPITAL ENCOUNTER (INPATIENT)
Age: 35
LOS: 7 days | Discharge: HOME | DRG: 753 | End: 2020-10-14
Attending: PSYCHIATRY & NEUROLOGY | Admitting: PSYCHIATRY & NEUROLOGY
Payer: COMMERCIAL

## 2020-10-07 VITALS
TEMPERATURE: 98.6 F | RESPIRATION RATE: 18 BRPM | DIASTOLIC BLOOD PRESSURE: 69 MMHG | HEIGHT: 71 IN | SYSTOLIC BLOOD PRESSURE: 118 MMHG | HEART RATE: 54 BPM | OXYGEN SATURATION: 95 % | WEIGHT: 162 LBS | BODY MASS INDEX: 22.68 KG/M2

## 2020-10-07 PROBLEM — F10.10 ETOH ABUSE: Status: ACTIVE | Noted: 2020-10-07

## 2020-10-07 LAB
ALBUMIN SERPL-MCNC: 3.8 G/DL (ref 3.5–4.6)
ALP BLD-CCNC: 69 U/L (ref 35–104)
ALT SERPL-CCNC: 120 U/L (ref 0–41)
ANION GAP SERPL CALCULATED.3IONS-SCNC: 17 MEQ/L (ref 9–15)
AST SERPL-CCNC: 161 U/L (ref 0–40)
BILIRUB SERPL-MCNC: 0.9 MG/DL (ref 0.2–0.7)
BUN BLDV-MCNC: 11 MG/DL (ref 6–20)
CALCIUM SERPL-MCNC: 9.4 MG/DL (ref 8.5–9.9)
CHLORIDE BLD-SCNC: 103 MEQ/L (ref 95–107)
CO2: 17 MEQ/L (ref 20–31)
CREAT SERPL-MCNC: 0.57 MG/DL (ref 0.7–1.2)
GFR AFRICAN AMERICAN: >60
GFR NON-AFRICAN AMERICAN: >60
GLOBULIN: 3.5 G/DL (ref 2.3–3.5)
GLUCOSE BLD-MCNC: 99 MG/DL (ref 70–99)
LV EF: 60 %
LVEF MODALITY: NORMAL
POTASSIUM SERPL-SCNC: 4.8 MEQ/L (ref 3.4–4.9)
SODIUM BLD-SCNC: 137 MEQ/L (ref 135–144)
TOTAL PROTEIN: 7.3 G/DL (ref 6.3–8)

## 2020-10-07 PROCEDURE — 93306 TTE W/DOPPLER COMPLETE: CPT

## 2020-10-07 PROCEDURE — 6360000002 HC RX W HCPCS: Performed by: INTERNAL MEDICINE

## 2020-10-07 PROCEDURE — 1240000000 HC EMOTIONAL WELLNESS R&B

## 2020-10-07 PROCEDURE — 36415 COLL VENOUS BLD VENIPUNCTURE: CPT

## 2020-10-07 PROCEDURE — 2580000003 HC RX 258: Performed by: INTERNAL MEDICINE

## 2020-10-07 PROCEDURE — 6370000000 HC RX 637 (ALT 250 FOR IP): Performed by: PSYCHIATRY & NEUROLOGY

## 2020-10-07 PROCEDURE — 93010 ELECTROCARDIOGRAM REPORT: CPT | Performed by: INTERNAL MEDICINE

## 2020-10-07 PROCEDURE — 6370000000 HC RX 637 (ALT 250 FOR IP): Performed by: INTERNAL MEDICINE

## 2020-10-07 PROCEDURE — 93005 ELECTROCARDIOGRAM TRACING: CPT | Performed by: INTERNAL MEDICINE

## 2020-10-07 PROCEDURE — 80053 COMPREHEN METABOLIC PANEL: CPT

## 2020-10-07 PROCEDURE — 99232 SBSQ HOSP IP/OBS MODERATE 35: CPT | Performed by: INTERNAL MEDICINE

## 2020-10-07 PROCEDURE — 90792 PSYCH DIAG EVAL W/MED SRVCS: CPT | Performed by: PSYCHIATRY & NEUROLOGY

## 2020-10-07 RX ORDER — LORAZEPAM 1 MG/1
2 TABLET ORAL
Status: DISCONTINUED | OUTPATIENT
Start: 2020-10-07 | End: 2020-10-07

## 2020-10-07 RX ORDER — LORAZEPAM 1 MG/1
4 TABLET ORAL
Status: DISCONTINUED | OUTPATIENT
Start: 2020-10-07 | End: 2020-10-11 | Stop reason: SINTOL

## 2020-10-07 RX ORDER — SODIUM CHLORIDE 0.9 % (FLUSH) 0.9 %
10 SYRINGE (ML) INJECTION PRN
Status: CANCELLED | OUTPATIENT
Start: 2020-10-07

## 2020-10-07 RX ORDER — TRAZODONE HYDROCHLORIDE 50 MG/1
50 TABLET ORAL NIGHTLY PRN
Status: DISCONTINUED | OUTPATIENT
Start: 2020-10-07 | End: 2020-10-09

## 2020-10-07 RX ORDER — LORAZEPAM 1 MG/1
4 TABLET ORAL
Status: CANCELLED | OUTPATIENT
Start: 2020-10-07

## 2020-10-07 RX ORDER — LANOLIN ALCOHOL/MO/W.PET/CERES
400 CREAM (GRAM) TOPICAL 2 TIMES DAILY
Status: DISCONTINUED | OUTPATIENT
Start: 2020-10-07 | End: 2020-10-14 | Stop reason: HOSPADM

## 2020-10-07 RX ORDER — KETOROLAC TROMETHAMINE 30 MG/ML
30 INJECTION, SOLUTION INTRAMUSCULAR; INTRAVENOUS EVERY 6 HOURS PRN
Status: DISCONTINUED | OUTPATIENT
Start: 2020-10-07 | End: 2020-10-07

## 2020-10-07 RX ORDER — POLYETHYLENE GLYCOL 3350 17 G/17G
17 POWDER, FOR SOLUTION ORAL DAILY PRN
Status: DISCONTINUED | OUTPATIENT
Start: 2020-10-07 | End: 2020-10-14 | Stop reason: HOSPADM

## 2020-10-07 RX ORDER — LORAZEPAM 1 MG/1
2 TABLET ORAL
Status: DISCONTINUED | OUTPATIENT
Start: 2020-10-07 | End: 2020-10-11 | Stop reason: SINTOL

## 2020-10-07 RX ORDER — LORAZEPAM 2 MG/ML
3 INJECTION INTRAMUSCULAR
Status: CANCELLED | OUTPATIENT
Start: 2020-10-07

## 2020-10-07 RX ORDER — ONDANSETRON 2 MG/ML
4 INJECTION INTRAMUSCULAR; INTRAVENOUS EVERY 6 HOURS PRN
Status: CANCELLED | OUTPATIENT
Start: 2020-10-07

## 2020-10-07 RX ORDER — SODIUM CHLORIDE 0.9 % (FLUSH) 0.9 %
10 SYRINGE (ML) INJECTION EVERY 12 HOURS SCHEDULED
Status: CANCELLED | OUTPATIENT
Start: 2020-10-07

## 2020-10-07 RX ORDER — LORAZEPAM 1 MG/1
3 TABLET ORAL
Status: DISCONTINUED | OUTPATIENT
Start: 2020-10-07 | End: 2020-10-07

## 2020-10-07 RX ORDER — ONDANSETRON 2 MG/ML
4 INJECTION INTRAMUSCULAR; INTRAVENOUS EVERY 6 HOURS PRN
Status: DISCONTINUED | OUTPATIENT
Start: 2020-10-07 | End: 2020-10-07

## 2020-10-07 RX ORDER — LORAZEPAM 0.5 MG/1
0.5 TABLET ORAL EVERY 4 HOURS PRN
Status: DISCONTINUED | OUTPATIENT
Start: 2020-10-07 | End: 2020-10-11 | Stop reason: SINTOL

## 2020-10-07 RX ORDER — HALOPERIDOL 5 MG
5 TABLET ORAL EVERY 4 HOURS PRN
Status: DISCONTINUED | OUTPATIENT
Start: 2020-10-07 | End: 2020-10-14 | Stop reason: HOSPADM

## 2020-10-07 RX ORDER — GABAPENTIN 300 MG/1
600 CAPSULE ORAL 3 TIMES DAILY
Status: CANCELLED | OUTPATIENT
Start: 2020-10-07

## 2020-10-07 RX ORDER — GABAPENTIN 300 MG/1
600 CAPSULE ORAL 3 TIMES DAILY
Status: DISCONTINUED | OUTPATIENT
Start: 2020-10-07 | End: 2020-10-14 | Stop reason: HOSPADM

## 2020-10-07 RX ORDER — LORAZEPAM 1 MG/1
3 TABLET ORAL
Status: DISCONTINUED | OUTPATIENT
Start: 2020-10-07 | End: 2020-10-11 | Stop reason: SINTOL

## 2020-10-07 RX ORDER — HALOPERIDOL 5 MG
5 TABLET ORAL EVERY 4 HOURS PRN
Status: CANCELLED | OUTPATIENT
Start: 2020-10-07

## 2020-10-07 RX ORDER — KETOROLAC TROMETHAMINE 30 MG/ML
30 INJECTION, SOLUTION INTRAMUSCULAR; INTRAVENOUS EVERY 6 HOURS PRN
Status: CANCELLED | OUTPATIENT
Start: 2020-10-07 | End: 2020-10-11

## 2020-10-07 RX ORDER — LORAZEPAM 1 MG/1
2 TABLET ORAL
Status: CANCELLED | OUTPATIENT
Start: 2020-10-07

## 2020-10-07 RX ORDER — LORAZEPAM 1 MG/1
1 TABLET ORAL
Status: CANCELLED | OUTPATIENT
Start: 2020-10-07

## 2020-10-07 RX ORDER — HYDROXYZINE HYDROCHLORIDE 25 MG/1
50 TABLET, FILM COATED ORAL 3 TIMES DAILY PRN
Status: DISCONTINUED | OUTPATIENT
Start: 2020-10-07 | End: 2020-10-14 | Stop reason: HOSPADM

## 2020-10-07 RX ORDER — PROMETHAZINE HYDROCHLORIDE 12.5 MG/1
12.5 TABLET ORAL EVERY 6 HOURS PRN
Status: CANCELLED | OUTPATIENT
Start: 2020-10-07

## 2020-10-07 RX ORDER — LORAZEPAM 2 MG/ML
2 INJECTION INTRAMUSCULAR
Status: CANCELLED | OUTPATIENT
Start: 2020-10-07

## 2020-10-07 RX ORDER — LORAZEPAM 1 MG/1
1 TABLET ORAL EVERY 4 HOURS PRN
Status: DISCONTINUED | OUTPATIENT
Start: 2020-10-07 | End: 2020-10-11 | Stop reason: SINTOL

## 2020-10-07 RX ORDER — BENZTROPINE MESYLATE 1 MG/ML
2 INJECTION INTRAMUSCULAR; INTRAVENOUS 2 TIMES DAILY PRN
Status: CANCELLED | OUTPATIENT
Start: 2020-10-07

## 2020-10-07 RX ORDER — THIAMINE MONONITRATE (VIT B1) 100 MG
100 TABLET ORAL DAILY
Status: DISCONTINUED | OUTPATIENT
Start: 2020-10-07 | End: 2020-10-07

## 2020-10-07 RX ORDER — BENZTROPINE MESYLATE 1 MG/ML
2 INJECTION INTRAMUSCULAR; INTRAVENOUS 2 TIMES DAILY PRN
Status: DISCONTINUED | OUTPATIENT
Start: 2020-10-07 | End: 2020-10-11

## 2020-10-07 RX ORDER — SODIUM CHLORIDE 0.9 % (FLUSH) 0.9 %
10 SYRINGE (ML) INJECTION EVERY 12 HOURS SCHEDULED
Status: DISCONTINUED | OUTPATIENT
Start: 2020-10-08 | End: 2020-10-07

## 2020-10-07 RX ORDER — ACETAMINOPHEN 325 MG/1
650 TABLET ORAL EVERY 4 HOURS PRN
Status: DISCONTINUED | OUTPATIENT
Start: 2020-10-07 | End: 2020-10-14 | Stop reason: HOSPADM

## 2020-10-07 RX ORDER — HALOPERIDOL 5 MG/ML
5 INJECTION INTRAMUSCULAR EVERY 4 HOURS PRN
Status: CANCELLED | OUTPATIENT
Start: 2020-10-07

## 2020-10-07 RX ORDER — POLYETHYLENE GLYCOL 3350 17 G/17G
17 POWDER, FOR SOLUTION ORAL DAILY PRN
Status: CANCELLED | OUTPATIENT
Start: 2020-10-07

## 2020-10-07 RX ORDER — LORAZEPAM 2 MG/ML
4 INJECTION INTRAMUSCULAR
Status: DISCONTINUED | OUTPATIENT
Start: 2020-10-07 | End: 2020-10-07

## 2020-10-07 RX ORDER — LORAZEPAM 2 MG/ML
3 INJECTION INTRAMUSCULAR
Status: DISCONTINUED | OUTPATIENT
Start: 2020-10-07 | End: 2020-10-07

## 2020-10-07 RX ORDER — MAGNESIUM HYDROXIDE/ALUMINUM HYDROXICE/SIMETHICONE 120; 1200; 1200 MG/30ML; MG/30ML; MG/30ML
30 SUSPENSION ORAL PRN
Status: DISCONTINUED | OUTPATIENT
Start: 2020-10-07 | End: 2020-10-14 | Stop reason: HOSPADM

## 2020-10-07 RX ORDER — MULTIVITAMIN WITH IRON
1 TABLET ORAL DAILY
Status: DISCONTINUED | OUTPATIENT
Start: 2020-10-07 | End: 2020-10-14 | Stop reason: HOSPADM

## 2020-10-07 RX ORDER — LORAZEPAM 1 MG/1
3 TABLET ORAL
Status: CANCELLED | OUTPATIENT
Start: 2020-10-07

## 2020-10-07 RX ORDER — LORAZEPAM 2 MG/ML
2 INJECTION INTRAMUSCULAR
Status: DISCONTINUED | OUTPATIENT
Start: 2020-10-07 | End: 2020-10-07

## 2020-10-07 RX ORDER — HYDROXYZINE HYDROCHLORIDE 25 MG/1
50 TABLET, FILM COATED ORAL 3 TIMES DAILY PRN
Status: CANCELLED | OUTPATIENT
Start: 2020-10-07

## 2020-10-07 RX ORDER — MULTIVITAMIN WITH IRON
1 TABLET ORAL DAILY
Status: CANCELLED | OUTPATIENT
Start: 2020-10-07

## 2020-10-07 RX ORDER — MULTIVITAMIN WITH IRON
1 TABLET ORAL DAILY
Status: DISCONTINUED | OUTPATIENT
Start: 2020-10-07 | End: 2020-10-07

## 2020-10-07 RX ORDER — ACETAMINOPHEN 325 MG/1
650 TABLET ORAL EVERY 4 HOURS PRN
Status: CANCELLED | OUTPATIENT
Start: 2020-10-07

## 2020-10-07 RX ORDER — SODIUM CHLORIDE 9 MG/ML
INJECTION, SOLUTION INTRAVENOUS CONTINUOUS
Status: DISCONTINUED | OUTPATIENT
Start: 2020-10-07 | End: 2020-10-07

## 2020-10-07 RX ORDER — HALOPERIDOL 5 MG/ML
5 INJECTION INTRAMUSCULAR EVERY 4 HOURS PRN
Status: DISCONTINUED | OUTPATIENT
Start: 2020-10-07 | End: 2020-10-14 | Stop reason: HOSPADM

## 2020-10-07 RX ORDER — LORAZEPAM 1 MG/1
4 TABLET ORAL
Status: DISCONTINUED | OUTPATIENT
Start: 2020-10-07 | End: 2020-10-07

## 2020-10-07 RX ORDER — LORAZEPAM 2 MG/ML
1 INJECTION INTRAMUSCULAR
Status: DISCONTINUED | OUTPATIENT
Start: 2020-10-07 | End: 2020-10-07

## 2020-10-07 RX ORDER — THIAMINE MONONITRATE (VIT B1) 100 MG
100 TABLET ORAL DAILY
Status: DISCONTINUED | OUTPATIENT
Start: 2020-10-07 | End: 2020-10-07 | Stop reason: SDUPTHER

## 2020-10-07 RX ORDER — THIAMINE MONONITRATE (VIT B1) 100 MG
100 TABLET ORAL DAILY
Status: CANCELLED | OUTPATIENT
Start: 2020-10-07

## 2020-10-07 RX ORDER — SODIUM CHLORIDE 9 MG/ML
INJECTION, SOLUTION INTRAVENOUS CONTINUOUS
Status: CANCELLED | OUTPATIENT
Start: 2020-10-07

## 2020-10-07 RX ORDER — LORAZEPAM 1 MG/1
1 TABLET ORAL
Status: DISCONTINUED | OUTPATIENT
Start: 2020-10-07 | End: 2020-10-07

## 2020-10-07 RX ORDER — TRAZODONE HYDROCHLORIDE 50 MG/1
50 TABLET ORAL NIGHTLY PRN
Status: CANCELLED | OUTPATIENT
Start: 2020-10-07

## 2020-10-07 RX ORDER — SODIUM CHLORIDE 0.9 % (FLUSH) 0.9 %
10 SYRINGE (ML) INJECTION PRN
Status: DISCONTINUED | OUTPATIENT
Start: 2020-10-07 | End: 2020-10-07

## 2020-10-07 RX ORDER — PROMETHAZINE HYDROCHLORIDE 12.5 MG/1
12.5 TABLET ORAL EVERY 6 HOURS PRN
Status: DISCONTINUED | OUTPATIENT
Start: 2020-10-07 | End: 2020-10-11

## 2020-10-07 RX ORDER — LANOLIN ALCOHOL/MO/W.PET/CERES
400 CREAM (GRAM) TOPICAL 2 TIMES DAILY
Status: CANCELLED | OUTPATIENT
Start: 2020-10-07

## 2020-10-07 RX ORDER — LORAZEPAM 2 MG/ML
4 INJECTION INTRAMUSCULAR
Status: CANCELLED | OUTPATIENT
Start: 2020-10-07

## 2020-10-07 RX ORDER — LORAZEPAM 2 MG/ML
1 INJECTION INTRAMUSCULAR
Status: CANCELLED | OUTPATIENT
Start: 2020-10-07

## 2020-10-07 RX ORDER — MAGNESIUM HYDROXIDE/ALUMINUM HYDROXICE/SIMETHICONE 120; 1200; 1200 MG/30ML; MG/30ML; MG/30ML
30 SUSPENSION ORAL PRN
Status: CANCELLED | OUTPATIENT
Start: 2020-10-07

## 2020-10-07 RX ADMIN — SODIUM CHLORIDE 100 ML/HR: 9 INJECTION, SOLUTION INTRAVENOUS at 06:27

## 2020-10-07 RX ADMIN — THERA TABS 1 TABLET: TAB at 17:21

## 2020-10-07 RX ADMIN — TRAZODONE HYDROCHLORIDE 50 MG: 50 TABLET ORAL at 20:23

## 2020-10-07 RX ADMIN — Medication 400 MG: at 20:43

## 2020-10-07 RX ADMIN — ACETAMINOPHEN 650 MG: 325 TABLET, FILM COATED ORAL at 17:23

## 2020-10-07 RX ADMIN — LORAZEPAM 2 MG: 2 INJECTION INTRAMUSCULAR; INTRAVENOUS at 06:58

## 2020-10-07 RX ADMIN — LORAZEPAM 2 MG: 2 INJECTION INTRAMUSCULAR; INTRAVENOUS at 13:11

## 2020-10-07 RX ADMIN — GABAPENTIN 600 MG: 300 CAPSULE ORAL at 13:11

## 2020-10-07 RX ADMIN — GABAPENTIN 600 MG: 300 CAPSULE ORAL at 20:23

## 2020-10-07 RX ADMIN — GABAPENTIN 600 MG: 300 CAPSULE ORAL at 09:55

## 2020-10-07 RX ADMIN — LORAZEPAM 0.5 MG: 0.5 TABLET ORAL at 17:21

## 2020-10-07 RX ADMIN — HYDROXYZINE HYDROCHLORIDE 50 MG: 25 TABLET, FILM COATED ORAL at 17:21

## 2020-10-07 RX ADMIN — LORAZEPAM 0.5 MG: 0.5 TABLET ORAL at 21:48

## 2020-10-07 RX ADMIN — KETOROLAC TROMETHAMINE 30 MG: 30 INJECTION, SOLUTION INTRAMUSCULAR at 13:10

## 2020-10-07 RX ADMIN — LORAZEPAM 2 MG: 2 INJECTION INTRAMUSCULAR; INTRAVENOUS at 02:50

## 2020-10-07 RX ADMIN — ACETAMINOPHEN 650 MG: 325 TABLET, FILM COATED ORAL at 21:48

## 2020-10-07 RX ADMIN — Medication 400 MG: at 09:55

## 2020-10-07 ASSESSMENT — ENCOUNTER SYMPTOMS
EYES NEGATIVE: 1
BLOOD IN STOOL: 0
SHORTNESS OF BREATH: 0
VOMITING: 0
GASTROINTESTINAL NEGATIVE: 1
DIARRHEA: 0
WHEEZING: 0
COUGH: 0
CHEST TIGHTNESS: 0
NAUSEA: 0
STRIDOR: 0
RESPIRATORY NEGATIVE: 1

## 2020-10-07 ASSESSMENT — PAIN SCALES - GENERAL
PAINLEVEL_OUTOF10: 0
PAINLEVEL_OUTOF10: 4
PAINLEVEL_OUTOF10: 7
PAINLEVEL_OUTOF10: 6
PAINLEVEL_OUTOF10: 6

## 2020-10-07 ASSESSMENT — SLEEP AND FATIGUE QUESTIONNAIRES
DIFFICULTY STAYING ASLEEP: YES
DIFFICULTY FALLING ASLEEP: YES
DO YOU HAVE DIFFICULTY SLEEPING: YES
AVERAGE NUMBER OF SLEEP HOURS: 5
DO YOU USE A SLEEP AID: NO
SLEEP PATTERN: INSOMNIA;NIGHTMARES/TERRORS
RESTFUL SLEEP: NO
DIFFICULTY ARISING: YES

## 2020-10-07 NOTE — PROGRESS NOTES
Progress Note  Date:10/7/2020       Room:W195/W195-01  Patient Name:Renny Weeks     YOB: 1985     Age:35 y.o. Subjective    Subjective:  Symptoms:  He reports weakness. No shortness of breath, malaise, cough, chest pain, headache, chest pressure, anorexia, diarrhea or anxiety. Diet:  No nausea or vomiting. Review of Systems   Respiratory: Negative for cough and shortness of breath. Cardiovascular: Negative for chest pain. Gastrointestinal: Negative for anorexia, diarrhea, nausea and vomiting. Neurological: Positive for weakness. Objective         Vitals Last 24 Hours:  TEMPERATURE:  Temp  Av.6 °F (37 °C)  Min: 98.6 °F (37 °C)  Max: 98.6 °F (37 °C)  RESPIRATIONS RANGE: Resp  Av.5  Min: 16  Max: 18  PULSE OXIMETRY RANGE: SpO2  Av %  Min: 95 %  Max: 99 %  PULSE RANGE: Pulse  Av.3  Min: 45  Max: 62  BLOOD PRESSURE RANGE: Systolic (12SZK), RJN:312 , Min:107 , LJQ:131   ; Diastolic (34AZZ), RRV:62, Min:53, Max:83    I/O (24Hr): Intake/Output Summary (Last 24 hours) at 10/7/2020 1102  Last data filed at 10/7/2020 1000  Gross per 24 hour   Intake 870 ml   Output 700 ml   Net 170 ml     Objective:  General Appearance:  Comfortable, well-appearing and in no acute distress. Vital signs: (most recent): Blood pressure 107/67, pulse 54, temperature 98.6 °F (37 °C), temperature source Oral, resp. rate 18, height 5' 11\" (1.803 m), weight 162 lb (73.5 kg), SpO2 95 %. HEENT: Normal HEENT exam.    Lungs:  Normal respiratory rate and increased effort. Breath sounds clear to auscultation. Heart: S1 normal and S2 normal.    Abdomen: Abdomen is soft. Bowel sounds are normal.   There is no epigastric area or suprapubic area tenderness. Neurological: Patient is alert and oriented to person, place and time. Pupils:  Pupils are equal, round, and reactive to light. Skin:  Warm and dry.       Labs/Imaging/Diagnostics    Labs:  CBC:  Recent Labs 10/05/20  1936 10/06/20  1838   WBC 6.4 5.1   RBC 4.48* 4.37*   HGB 14.8 14.6   HCT 44.3 43.6   MCV 99.0 99.9   RDW 13.9 13.7    289     CHEMISTRIES:  Recent Labs     10/05/20  1936 10/06/20  1322 10/07/20  0550    139 137   K 4.2 4.1 4.8    102 103   CO2 25 29 17*   BUN 9 10 11   CREATININE 0.68* 0.66* 0.57*   GLUCOSE 101* 114* 99   MG  --  1.8  --      PT/INR:No results for input(s): PROTIME, INR in the last 72 hours. APTT:No results for input(s): APTT in the last 72 hours. LIVER PROFILE:  Recent Labs     10/05/20  1936 10/06/20  0157 10/07/20  0550   * 176* 161*   * 112* 120*   BILIDIR  --  <0.2  --    BILITOT 0.9* 0.7 0.9*   ALKPHOS 82 73 69       Imaging Last 24 Hours:  No results found.   Assessment//Plan           Hospital Problems           Last Modified POA    QT prolongation 10/6/2020 Yes    ETOH abuse 10/7/2020 Yes        Assessment & Plan  1) Etoh abuse with withdrawal  2) elevated LFT due to alcohol and hep c  3) sucicidal idealation  D/c to psych  4) cocaine abuse  Counseling given  5) QT prolongation   cardiology cleared  Electronically signed by Zayda Mackenzie MD on 10/7/20 at 11:02 AM EDT

## 2020-10-07 NOTE — PROGRESS NOTES
Patient did not attend group despite staff encouragement.   Electronically signed by Angela Craig on 10/7/2020 at 5:10 PM

## 2020-10-07 NOTE — PROGRESS NOTES
Pt arrived via [facility]. Skin and contraband check negative. performed with this writer and [address] Patient reports he is tired and wants to stay in his room. Refused tour. Denies SI. Contracts for safety. Admission complete, consents need to be signed.
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
## 2020-10-07 NOTE — PROGRESS NOTES
report called in by Surinder Hairston RN 1W  RONEY with SI, then 1W d/t prolonged QTc  cleared by cardio which will follow and Dr. Pilar Bishop  EKG today QTc 430, IV fluids @100 overnight  On CIWA q4h  Bilateral superficial scratches, healed  covid neg.   pt using imodium 24mg AC and HS daily  consuming 1/5 vodka daily

## 2020-10-07 NOTE — PROGRESS NOTES
Progress Note  Patient: Paula Brar  Unit/Bed: Z709/F394-67  YOB: 1985  MRN: [id number]  Acct: [de-identified]   Admitting Diagnosis: QT prolongation [R94.31]  Admit Date:  10/5/2020  Hospital Day: 0    Chief Complaint: QT prologation    Histories:  Past Medical History:   Diagnosis Date    Neuropathy      History reviewed. No pertinent surgical history. History reviewed. No pertinent family history. Social History     Socioeconomic History    Marital status: Single     Spouse name: None    Number of children: None    Years of education: None    Highest education level: None   Occupational History    None   Social Needs    Financial resource strain: None    Food insecurity     Worry: None     Inability: None    Transportation needs     Medical: None     Non-medical: None   Tobacco Use    Smoking status: Current Some Day Smoker     Types: Cigarettes    Smokeless tobacco: Never Used   Substance and Sexual Activity    Alcohol use: Yes     Comment: A 5th a day    Drug use: None    Sexual activity: None   Lifestyle    Physical activity     Days per week: None     Minutes per session: None    Stress: None   Relationships    Social connections     Talks on phone: None     Gets together: None     Attends [employer] service: None     Active member of club or organization: None     Attends meetings of clubs or organizations: None     Relationship status: None    Intimate partner violence     Fear of current or ex partner: None     Emotionally abused: None     Physically abused: None     Forced sexual activity: None   Other Topics Concern    None   Social History Narrative    None       Subjective/HPI: No CP no sob slept well. EKG:        Review of Systems:   Review of Systems   Constitutional: Negative. Negative for diaphoresis and fatigue. HENT: Negative. Eyes: Negative. Respiratory: Negative. Negative for cough, chest tightness, shortness of breath, wheezing and stridor. 10/06/2020    MONOPCT 9.6 10/06/2020    BASOPCT 0.5 10/06/2020    MONOSABS 0.5 10/06/2020    LYMPHSABS 1.1 10/06/2020    EOSABS 0.0 10/06/2020    BASOSABS 0.0 10/06/2020     CMP:    Lab Results   Component Value Date     10/07/2020    K 4.8 10/07/2020     10/07/2020    CO2 17 10/07/2020    BUN 11 10/07/2020    CREATININE 0.57 10/07/2020    GFRAA >60.0 10/07/2020    LABGLOM >60.0 10/07/2020    GLUCOSE 99 10/07/2020    PROT 7.3 10/07/2020    LABALBU 3.8 10/07/2020    CALCIUM 9.4 10/07/2020    BILITOT 0.9 10/07/2020    ALKPHOS 69 10/07/2020     10/07/2020     10/07/2020     BMP:    Lab Results   Component Value Date     10/07/2020    K 4.8 10/07/2020     10/07/2020    CO2 17 10/07/2020    BUN 11 10/07/2020    LABALBU 3.8 10/07/2020    CREATININE 0.57 10/07/2020    CALCIUM 9.4 10/07/2020    GFRAA >60.0 10/07/2020    LABGLOM >60.0 10/07/2020    GLUCOSE 99 10/07/2020     Magnesium:    Lab Results   Component Value Date    MG 1.8 10/06/2020     Troponin:    Lab Results   Component Value Date    TROPONINI <0.010 10/06/2020        Active Hospital Problems    Diagnosis Date Noted    QT prolongation [R94.31] 10/06/2020     Priority: Low        Assessment/Plan:  1. Suicidal Ideation  2. Multisubstance abuse - needs detox  3. Prolonged QTc likely related to Loperamide OD. This can lead to serious Ventricular Arrhythmias.   Will give Empiric Mag oxide bid. stble this am w QTc 430 ms.   4. OK for Psychiatry inpt         Electronically signed by Unique Mcdonnell MD on 10/7/2020 at 9:17 AM

## 2020-10-07 NOTE — PROGRESS NOTES
Pt in semi-fowlers A+Ox4, Pt IV ws removed, dressed with 2x2 dressing secured with tape. Wu GRIMALDO notified/aware.

## 2020-10-07 NOTE — PROGRESS NOTES
Called for report. Alli Gather on other line per . I will call back shortly.   Electronically signed by Neelima Diaz RN on 10/7/2020 at 2:09 PM

## 2020-10-08 PROBLEM — F31.9 BIPOLAR DEPRESSION (HCC): Status: ACTIVE | Noted: 2020-10-08

## 2020-10-08 LAB
ALBUMIN SERPL-MCNC: 3.9 G/DL (ref 3.5–4.6)
ALP BLD-CCNC: 73 U/L (ref 35–104)
ALT SERPL-CCNC: 113 U/L (ref 0–41)
ANION GAP SERPL CALCULATED.3IONS-SCNC: 12 MEQ/L (ref 9–15)
AST SERPL-CCNC: 105 U/L (ref 0–40)
BILIRUB SERPL-MCNC: 0.8 MG/DL (ref 0.2–0.7)
BUN BLDV-MCNC: 12 MG/DL (ref 6–20)
CALCIUM SERPL-MCNC: 9.3 MG/DL (ref 8.5–9.9)
CHLORIDE BLD-SCNC: 102 MEQ/L (ref 95–107)
CO2: 23 MEQ/L (ref 20–31)
CREAT SERPL-MCNC: 0.75 MG/DL (ref 0.7–1.2)
GFR AFRICAN AMERICAN: >60
GFR NON-AFRICAN AMERICAN: >60
GLOBULIN: 3 G/DL (ref 2.3–3.5)
GLUCOSE BLD-MCNC: 133 MG/DL (ref 70–99)
POTASSIUM REFLEX MAGNESIUM: 3.8 MEQ/L (ref 3.4–4.9)
POTASSIUM SERPL-SCNC: 3.8 MEQ/L (ref 3.4–4.9)
SODIUM BLD-SCNC: 137 MEQ/L (ref 135–144)
TOTAL PROTEIN: 6.9 G/DL (ref 6.3–8)

## 2020-10-08 PROCEDURE — 6360000002 HC RX W HCPCS: Performed by: PSYCHIATRY & NEUROLOGY

## 2020-10-08 PROCEDURE — 6370000000 HC RX 637 (ALT 250 FOR IP): Performed by: PSYCHIATRY & NEUROLOGY

## 2020-10-08 PROCEDURE — 6370000000 HC RX 637 (ALT 250 FOR IP): Performed by: INTERNAL MEDICINE

## 2020-10-08 PROCEDURE — 36415 COLL VENOUS BLD VENIPUNCTURE: CPT

## 2020-10-08 PROCEDURE — 80053 COMPREHEN METABOLIC PANEL: CPT

## 2020-10-08 PROCEDURE — 1240000000 HC EMOTIONAL WELLNESS R&B

## 2020-10-08 PROCEDURE — 99223 1ST HOSP IP/OBS HIGH 75: CPT | Performed by: PSYCHIATRY & NEUROLOGY

## 2020-10-08 RX ORDER — TRAMADOL HYDROCHLORIDE 50 MG/1
50 TABLET ORAL EVERY 6 HOURS
Status: DISCONTINUED | OUTPATIENT
Start: 2020-10-11 | End: 2020-10-08

## 2020-10-08 RX ORDER — TRAMADOL HYDROCHLORIDE 50 MG/1
100 TABLET ORAL EVERY 6 HOURS
Status: DISCONTINUED | OUTPATIENT
Start: 2020-10-09 | End: 2020-10-08

## 2020-10-08 RX ORDER — TRAMADOL HYDROCHLORIDE 50 MG/1
100 TABLET ORAL EVERY 4 HOURS
Status: DISCONTINUED | OUTPATIENT
Start: 2020-10-08 | End: 2020-10-08

## 2020-10-08 RX ORDER — TRAMADOL HYDROCHLORIDE 50 MG/1
50 TABLET ORAL EVERY 8 HOURS
Status: DISCONTINUED | OUTPATIENT
Start: 2020-10-12 | End: 2020-10-08

## 2020-10-08 RX ORDER — BENZTROPINE MESYLATE 1 MG/1
1 TABLET ORAL 2 TIMES DAILY
Status: DISCONTINUED | OUTPATIENT
Start: 2020-10-08 | End: 2020-10-11

## 2020-10-08 RX ORDER — TRAMADOL HYDROCHLORIDE 50 MG/1
100 TABLET ORAL EVERY 8 HOURS
Status: DISCONTINUED | OUTPATIENT
Start: 2020-10-10 | End: 2020-10-08

## 2020-10-08 RX ADMIN — LORAZEPAM 0.5 MG: 0.5 TABLET ORAL at 14:48

## 2020-10-08 RX ADMIN — GABAPENTIN 600 MG: 300 CAPSULE ORAL at 14:04

## 2020-10-08 RX ADMIN — Medication 400 MG: at 20:52

## 2020-10-08 RX ADMIN — HYDROXYZINE HYDROCHLORIDE 50 MG: 25 TABLET, FILM COATED ORAL at 20:52

## 2020-10-08 RX ADMIN — THERA TABS 1 TABLET: TAB at 08:59

## 2020-10-08 RX ADMIN — BENZTROPINE MESYLATE 2 MG: 1 INJECTION INTRAMUSCULAR; INTRAVENOUS at 12:27

## 2020-10-08 RX ADMIN — ACETAMINOPHEN 650 MG: 325 TABLET, FILM COATED ORAL at 22:53

## 2020-10-08 RX ADMIN — Medication 400 MG: at 08:59

## 2020-10-08 RX ADMIN — TRAZODONE HYDROCHLORIDE 50 MG: 50 TABLET ORAL at 20:52

## 2020-10-08 RX ADMIN — LORAZEPAM 0.5 MG: 0.5 TABLET ORAL at 18:56

## 2020-10-08 RX ADMIN — TRAMADOL HYDROCHLORIDE 100 MG: 50 TABLET, FILM COATED ORAL at 05:37

## 2020-10-08 RX ADMIN — LORAZEPAM 0.5 MG: 0.5 TABLET ORAL at 10:41

## 2020-10-08 RX ADMIN — GABAPENTIN 600 MG: 300 CAPSULE ORAL at 08:59

## 2020-10-08 RX ADMIN — CARIPRAZINE 1.5 MG: 1.5 CAPSULE, GELATIN COATED ORAL at 10:37

## 2020-10-08 RX ADMIN — TRAMADOL HYDROCHLORIDE 100 MG: 50 TABLET, FILM COATED ORAL at 00:58

## 2020-10-08 RX ADMIN — BENZTROPINE MESYLATE 1 MG: 1 TABLET ORAL at 20:52

## 2020-10-08 RX ADMIN — GABAPENTIN 600 MG: 300 CAPSULE ORAL at 20:51

## 2020-10-08 RX ADMIN — TRAMADOL HYDROCHLORIDE 100 MG: 50 TABLET, FILM COATED ORAL at 08:58

## 2020-10-08 ASSESSMENT — LIFESTYLE VARIABLES: HISTORY_ALCOHOL_USE: YES

## 2020-10-08 ASSESSMENT — PAIN DESCRIPTION - LOCATION
LOCATION: BACK
LOCATION: GENERALIZED

## 2020-10-08 ASSESSMENT — PAIN SCALES - GENERAL
PAINLEVEL_OUTOF10: 9
PAINLEVEL_OUTOF10: 7
PAINLEVEL_OUTOF10: 3
PAINLEVEL_OUTOF10: 4
PAINLEVEL_OUTOF10: 0
PAINLEVEL_OUTOF10: 7

## 2020-10-08 NOTE — CONSULTS
[facility]    HISTORY AND PHYSICAL EXAM    PATIENT NAME:  Jarett Wilson    MRN:  [id number]  SERVICE DATE:  10/8/2020   SERVICE TIME:  9:49 AM    Primary Care Physician: No primary care provider on file. SUBJECTIVE  CHIEF COMPLAINT:  Medically appropriate for inpatient psychiatry admission. Consult for medical H/P encounter. HPI:  This is a 38 y.o. male with PMHx of chronic prolonged QT, multiple drug overdoses, IVDU, etoh abuse, hepatitis C and depression who presented to emergency room with suicidal idelation. PT admits abusing lopramide and alcohol. Patient was admitted to Cardiac floor for further monitoring of prolonged QT. Patient was treated with Mag oxide. Echo was negative. QT improved and patient was cleared to behavioral unit. Patient Seen, Chart, Labs, Radiologystudies, and Consults reviewed. Patient denies headache, chest pain, shortness of breath, N/V/D/C, fever/chills. PAST MEDICAL HISTORY:    Past Medical History:   Diagnosis Date    Neuropathy      PAST SURGICAL HISTORY:  History reviewed. No pertinent surgical history. FAMILY HISTORY:  History reviewed. No pertinent family history.   SOCIAL HISTORY:    Social History     Socioeconomic History    Marital status: Single     Spouse name: Not on file    Number of children: Not on file    Years of education: Not on file    Highest education level: Not on file   Occupational History    Not on file   Social Needs    Financial resource strain: Not on file    Food insecurity     Worry: Not on file     Inability: Not on file    Transportation needs     Medical: Not on file     Non-medical: Not on file   Tobacco Use    Smoking status: Current Some Day Smoker     Types: Cigarettes    Smokeless tobacco: Never Used   Substance and Sexual Activity    Alcohol use: Yes     Comment: A 5th a day    Drug use: Not on file    Sexual activity: Not on file   Lifestyle    Physical activity     Days per week: Not on file Minutes per session: Not on file    Stress: Not on file   Relationships    Social connections     Talks on phone: Not on file     Gets together: Not on file     Attends [employer] service: Not on file     Active member of club or organization: Not on file     Attends meetings of clubs or organizations: Not on file     Relationship status: Not on file    Intimate partner violence     Fear of current or ex partner: Not on file     Emotionally abused: Not on file     Physically abused: Not on file     Forced sexual activity: Not on file   Other Topics Concern    Not on file   Social History Narrative    Not on file     MEDICATIONS:    Current Facility-Administered Medications   Medication Dose Route Frequency Provider Last Rate Last Dose    traMADol (ULTRAM) tablet 100 mg  100 mg Oral Q4H Bob Regalado MD   100 mg at 10/08/20 0858    Followed by   Kaiden Fonseca ON 10/9/2020] traMADol (ULTRAM) tablet 100 mg  100 mg Oral Q6H Bob Regalado MD        Followed by   Kaiden Fonseca ON 10/10/2020] traMADol Jean-Paul Lade) tablet 100 mg  100 mg Oral Q8H Bob Regalado MD        Followed by   Kaiden Fonseca ON 10/11/2020] traMADol (ULTRAM) tablet 50 mg  50 mg Oral Q6H Bob Regalado MD        Followed by   Kaiden Fonseca ON 10/12/2020] traMADol (ULTRAM) tablet 50 mg  50 mg Oral Q8H Bob Regalado MD        acetaminophen (TYLENOL) tablet 650 mg  650 mg Oral Q4H PRN Jamila Morgan MD   650 mg at 10/07/20 2148    aluminum & magnesium hydroxide-simethicone (MAALOX) 200-200-20 MG/5ML suspension 30 mL  30 mL Oral PRN Jamila Morgan MD        benztropine mesylate (COGENTIN) injection 2 mg  2 mg Intramuscular BID PRN Jamila Morgan MD        haloperidol lactate (HALDOL) injection 5 mg  5 mg Intramuscular Q4H PRN Jamila Morgan MD        Or    haloperidol (HALDOL) tablet 5 mg  5 mg Oral Q4H PRN Jamila Morgan MD        hydrOXYzine (ATARAX) tablet 50 mg  50 mg Oral TID PRN Jamila Morgan MD   50 mg at 10/07/20 1721    magnesium hydroxide (MILK OF MAGNESIA) 400 MG/5ML suspension 30 mL  30 mL Oral Daily PRN Nohemi Mehta MD        traZODone (DESYREL) tablet 50 mg  50 mg Oral Nightly PRN Nohemi Mehta MD   50 mg at 10/07/20 2023    polyethylene glycol (GLYCOLAX) packet 17 g  17 g Oral Daily PRN Zayda Mackenzie MD        promethazine (PHENERGAN) tablet 12.5 mg  12.5 mg Oral Q6H PRN Zayda Mackenzie MD        gabapentin (NEURONTIN) capsule 600 mg  600 mg Oral TID Zayda Mackenzie MD   600 mg at 10/08/20 0859    magnesium oxide (MAG-OX) tablet 400 mg  400 mg Oral BID Zayda Mackenzie MD   400 mg at 10/08/20 0859    multivitamin 1 tablet  1 tablet Oral Daily Nohemi Mehta MD   1 tablet at 10/08/20 0859    LORazepam (ATIVAN) tablet 0.5 mg  0.5 mg Oral Q4H PRN Nohemi Mehta MD   0.5 mg at 10/07/20 2148    Or    LORazepam (ATIVAN) tablet 1 mg  1 mg Oral Q4H PRN Nohemi Mehta MD        Or    LORazepam (ATIVAN) tablet 2 mg  2 mg Oral Q2H PRN Nohemi Mehta MD        Or    LORazepam (ATIVAN) tablet 3 mg  3 mg Oral Q1H PRN Nohemi Mehta MD        Or    LORazepam (ATIVAN) tablet 4 mg  4 mg Oral Q1H PRN Nohemi Mehta MD        influenza quadrivalent split vaccine (FLUZONE;FLUARIX;FLULAVAL;AFLURIA) injection 0.5 mL  0.5 mL Intramuscular Once Nohemi Mehta MD           ALLERGIES: Quetiapine and Penicillins    REVIEW OF SYSTEM:   ROS as noted in HPI, 12 point ROS reviewed and otherwise negative.     OBJECTIVE  PHYSICAL EXAM: /86   Pulse 82   Temp 98.7 °F (37.1 °C) (Oral)   Resp 18   SpO2 99%   CONSTITUTIONAL:  awake, alert, cooperative, no apparent distress, and appears stated age  EYES:  Lids and lashes normal, pupils equal, round and reactive to light, extra ocular muscles intact, sclera clear, conjunctiva normal  ENT:  Normocephalic, without obvious abnormality, atraumatic, sinuses nontender on palpation, external ears without lesions, oral pharynx with moist mucus membranes, tonsils without erythema or exudates, gums normal and good dentition. NECK:  Supple, symmetrical, trachea midline, no adenopathy, thyroid symmetric, not enlarged and no tenderness, skin normal  LUNGS:  No increased work of breathing, good air exchange, clear to auscultation bilaterally, no crackles or wheezing  CARDIOVASCULAR:  Normal apical impulse, regular rate and rhythm, normal S1 and S2, no S3 or S4, and no murmur noted  ABDOMEN:  No scars, normal bowel sounds, soft, non-distended, non-tender, no masses palpated, no hepatosplenomegally  MUSCULOSKELETAL:  There is no redness, warmth, or swelling of the joints. Full range of motion noted. Motor strength is 5 out of 5 all extremities bilaterally. Tone is normal.  NEUROLOGIC:  Awake, alert, oriented to name, place and time. Cranial nerves II-XII are grossly intact. Motor is 5 out of 5 bilaterally. Sensory is intact.  gait is normal.  SKIN:  no bruising or bleeding, normal skin color, texture, turgor, no redness, warmth, or swelling and no jaundice    DATA:     Diagnostic tests reviewed for today's visit:    Most recent labs and imaging results reviewed. VTE Prophylaxis:     ASSESSMENT AND PLAN  Active Problems:  # Major depressive disorder with current active episode, unspecified depression episode severity, unspecified whether recurrent     Patient admitted to behavorial health for evaluation and treatment     # Alcohol dependence with withdrawal: CIWA protocol, seizure precautions, fall precautions Ativan prn CIWA triggered. Multivitamin, thiamine, folate IVPB. Prior to discharge, will change to PO supplements. Chemical dependency consulted. SW on board. # Narcotic Addiction with hyperalgesia, anxiety and drug seeking behavior: Chemical Dependency consulted. Pt counseled at length. Discussed MAT and sobriety housing and counseling resources, in addition to importance of cessation from Nicotine in relation to successful recovery from narcotic addiction.  If appropriate will contact [facility] recovery services prior to discharge to assist patient in connecting with services to assist with recovery from [address] [address] as outpatient. # Chronic Hepatitis C Avoid hepatotoxic agents including tylenol and any acetaminophen containing agents. Holding hepatotoxic home meds. # Tobacco and nicotine abuse and dependence: Counseled for greater than 3 minutes on importance of smoking cessation to reduce risk of associated morbidity and mortality. Nicotine patch. This is only a history and physical examination and not medical management. The patient is to contact and follow up with their primary care physician and go over any abnormal labs, imaging, findings, medical concerns, or conditions that we have and have not addressed during this encounter.     Plan of care discussed with: patient    SIGNATURE: LORENA Hoang CNP  DATE: October 8, 2020  TIME: 9:49 AM

## 2020-10-08 NOTE — PROGRESS NOTES
PS dr Mary Grace Arambula pt c/o opiate like w/d sx. From abuse of imodium. Pt placed on CINA,tramadol, given at 0058.

## 2020-10-08 NOTE — PROGRESS NOTES
Pt. refused to attend the 1000 skills group, despite staff encouragement. Electronically signed by Linwood Hector, [address] on 10/8/2020 at 1:21 PM

## 2020-10-08 NOTE — PROGRESS NOTES
Patient did not attend group despite staff encouragement.   Electronically signed by Rachel Sanz on 10/7/2020 at 9:27 PM

## 2020-10-08 NOTE — PROGRESS NOTES
Patient did not attend group despite staff encouragement.   Electronically signed by Janey Aranda on 10/8/2020 at 7:12 PM

## 2020-10-08 NOTE — FLOWSHEET NOTE
PT remains isolative to room thus far this shift. Refused his breakfast tray. Short,evasive and guarded during interview. Reports living with his grandfather but that their relationship was \"fizzling out\" which lead to his drinking. Reports poor sleep and appetite. States he has not slept in 3 days or ate in 5 days. Does admit to drinking fluids. Reports his depression 7/10 and anxiety 6/10. Denies any SI, HI or AVH. States, \"no none of that just going to the withdrawal right now\". Noted to have been resting in bed with eyes closed. At nurses station this afternoon requesting cogentin. States, \"I took the vraylar and now my legs are super stiff like. Can you give me some cogentin\". Administer IM PRN cogentin; pt tolerated well. States he does not want lunch but that he will try to eat some dinner tonight. Will continue to monitor.

## 2020-10-08 NOTE — PROGRESS NOTES
Pt. declined to attend the 0900 community meeting, despite staff encouragement. Electronically signed by Dianna Car [address] on 10/8/2020 at 9:31 AM
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
## 2020-10-08 NOTE — PROGRESS NOTES
Patient did not attend group despite staff encouragement.   Electronically signed by Rheba Market on 10/8/2020 at 4:56 PM
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
## 2020-10-08 NOTE — SUICIDE SAFETY PLAN
SAFETY PLAN    A suicide Safety Plan is a document that supports someone when they are having thoughts of suicide. Warning Signs that indicate a suicidal crisis may be developing: What (situations, thoughts, feelings, body sensations, behaviors, etc.) do you experience that lets you know you are beginning to think about suicide? 1. Anxious  2. Sweaty  3. Tearful    Internal Coping Strategies:  What things can I do (relaxation techniques, hobbies, physical activities, etc.) to take my mind off my problems without contacting another person? 1. [place]  2. Biking  3. Music    People and social settings that provide distraction: Who can I call or where can I go to distract me? 1. Name: [facility]) Phone: Unknown  2. Name:             Phone:   3. Place: Pet Store            4. Place: Outdoors    People whom I can ask for help: Who can I call when I need help - for example, friends, family, clergy, someone else? 1. Name: [facility])        Phone: Unknown  2. Name: NA  Phone: NA  3. Name:   Phone:     Professionals or [address] [place] [address] I can contact during a crisis: Who can I call for help - for example, my doctor, my psychiatrist, my psychologist, a mental health provider, a suicide hotline? 1. Clinician Name:   Phone:      Clinician Pager or Emergency Contact #:     2. Clinician Name:    Phone:       Clinician Pager or Emergency Contact #:     3. Suicide Prevention Lifeline: 4-927-328-TALK (0018)    4. [address] [place] [address] Emergency Services -  for example, [address], [facility]line[facility] Hotline: [facility]      Emergency Services Address: [address] [place] [address] Emergency Services Phone: [place], [address] [facility]    Making the environment safe: How can I make my environment (house/apartment/living space) safer? For example, can I remove guns, medications, and other items? 1. Ask for help if I need it.   2. Take medications as prescribed.     Electronically signed by RUTH Pena, HERMILAW on 10/8/2020 at 2:40 PM
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
## 2020-10-08 NOTE — CARE COORDINATION
Brief Intervention and Referral to Treatment Summary    Patient was provided PHQ-9, AUDIT and DAST Screening:      PHQ-9 Score: 27  AUDIT Score:  36  DAST Score:  0    Patients substance use is considered     Low Risk/Healthy  Moderate Risk  Harmful  Dependent X    Patients depression is considered:     Minimal  Mild   Moderate  Moderately Severe  Severe X    Brief Education Was Provided    Patient was receptive  Patient was not receptive X      Brief Intervention Is Provided (Only for AUDIT or DAST)     Patient reports readiness to decrease and/or stop use and a plan was discussed   Patient denies readiness to decrease and/or stop use and a plan was not discussed      Recommendations/Referrals for Brief and/or Specialized Treatment Provided to Patient Patient endorses recent relapse on ETOH after 18 months of sobriety. He denied use of substances for this writer, but tested positive for cocaine on UDS. Per chart review, the patient also has a hx of heroin use.   Patient was guarded and evasive throughout interview; he is not interested in [facility] referral; will revisit referral with patient in AM.    Electronically signed by RUTH Bo LSW on 10/8/2020 at 2:49 PM

## 2020-10-09 LAB
ALBUMIN SERPL-MCNC: 4 G/DL (ref 3.5–4.6)
ALP BLD-CCNC: 67 U/L (ref 35–104)
ALT SERPL-CCNC: 111 U/L (ref 0–41)
ANION GAP SERPL CALCULATED.3IONS-SCNC: 20 MEQ/L (ref 9–15)
AST SERPL-CCNC: 101 U/L (ref 0–40)
BILIRUB SERPL-MCNC: 0.7 MG/DL (ref 0.2–0.7)
BUN BLDV-MCNC: 11 MG/DL (ref 6–20)
CALCIUM SERPL-MCNC: 9.8 MG/DL (ref 8.5–9.9)
CHLORIDE BLD-SCNC: 102 MEQ/L (ref 95–107)
CO2: 13 MEQ/L (ref 20–31)
CREAT SERPL-MCNC: 0.71 MG/DL (ref 0.7–1.2)
GFR AFRICAN AMERICAN: >60
GFR NON-AFRICAN AMERICAN: >60
GLOBULIN: 3.9 G/DL (ref 2.3–3.5)
GLUCOSE BLD-MCNC: 104 MG/DL (ref 70–99)
POTASSIUM SERPL-SCNC: 4.6 MEQ/L (ref 3.4–4.9)
SODIUM BLD-SCNC: 135 MEQ/L (ref 135–144)
TOTAL PROTEIN: 7.9 G/DL (ref 6.3–8)

## 2020-10-09 PROCEDURE — 99232 SBSQ HOSP IP/OBS MODERATE 35: CPT | Performed by: PSYCHIATRY & NEUROLOGY

## 2020-10-09 PROCEDURE — 6370000000 HC RX 637 (ALT 250 FOR IP): Performed by: PSYCHIATRY & NEUROLOGY

## 2020-10-09 PROCEDURE — 1240000000 HC EMOTIONAL WELLNESS R&B

## 2020-10-09 PROCEDURE — 6370000000 HC RX 637 (ALT 250 FOR IP): Performed by: INTERNAL MEDICINE

## 2020-10-09 PROCEDURE — 80053 COMPREHEN METABOLIC PANEL: CPT

## 2020-10-09 PROCEDURE — 36415 COLL VENOUS BLD VENIPUNCTURE: CPT

## 2020-10-09 RX ORDER — TRAZODONE HYDROCHLORIDE 100 MG/1
100 TABLET ORAL NIGHTLY PRN
Status: DISCONTINUED | OUTPATIENT
Start: 2020-10-09 | End: 2020-10-14 | Stop reason: HOSPADM

## 2020-10-09 RX ADMIN — HALOPERIDOL 5 MG: 5 TABLET ORAL at 23:48

## 2020-10-09 RX ADMIN — CARIPRAZINE 1.5 MG: 1.5 CAPSULE, GELATIN COATED ORAL at 08:35

## 2020-10-09 RX ADMIN — GABAPENTIN 600 MG: 300 CAPSULE ORAL at 08:35

## 2020-10-09 RX ADMIN — GABAPENTIN 600 MG: 300 CAPSULE ORAL at 14:04

## 2020-10-09 RX ADMIN — BENZTROPINE MESYLATE 1 MG: 1 TABLET ORAL at 08:35

## 2020-10-09 RX ADMIN — GABAPENTIN 600 MG: 300 CAPSULE ORAL at 21:28

## 2020-10-09 RX ADMIN — LORAZEPAM 0.5 MG: 0.5 TABLET ORAL at 08:35

## 2020-10-09 RX ADMIN — BENZTROPINE MESYLATE 1 MG: 1 TABLET ORAL at 21:28

## 2020-10-09 RX ADMIN — Medication 400 MG: at 08:35

## 2020-10-09 RX ADMIN — THERA TABS 1 TABLET: TAB at 08:35

## 2020-10-09 RX ADMIN — Medication 400 MG: at 21:28

## 2020-10-09 RX ADMIN — TRAZODONE HYDROCHLORIDE 100 MG: 100 TABLET ORAL at 21:28

## 2020-10-09 NOTE — PROGRESS NOTES
Ativan given for [facility] at 4753. Anxiety #7. Complains of seeing different things that aren't there.  Things growing, faces of people

## 2020-10-09 NOTE — CARE COORDINATION
Patient did not attend group despite staff encouragement.   Electronically signed by Tena Gerard on 10/9/2020 at 3:32 PM

## 2020-10-09 NOTE — PROGRESS NOTES
Patient did not attend group despite staff encouragement.   Electronically signed by Gisele Huber on 10/9/2020 at 4:57 PM

## 2020-10-09 NOTE — PROGRESS NOTES
Patient did not attend group despite staff encouragement.   Electronically signed by Ronaldo Ratliff on 10/9/2020 at 7:07 PM

## 2020-10-09 NOTE — PROGRESS NOTES
On rounds this nurse watched patient swiping something ( nothing was there that I saw )  from his side rails. Then cupping his hand , catching it and tossing it away from him. . patient reported seeing things.    ( spider webs, things growing )

## 2020-10-09 NOTE — PROGRESS NOTES
Pt. declined to attend the 0900 community meeting, despite staff encouragement.  Electronically signed by Linda Lima on 10/9/2020 at 12:15 PM

## 2020-10-09 NOTE — PROGRESS NOTES
Pt isolating to room. Pt denies shower or ADL's today. Pt encouraged to participate in proper hygiene practices. Pt presents with poor hygiene and unkempt appearance. Pt reports poor appetite. Pt states, \"It was getting a little better\". Pt reports poor sleep, due to trouble falling asleep and staying asleep. Pt states, \"I hope tonight I will get more sleep\". Pt rates anxiety 6/10. Pt rates depression 5/10. On a scale from 1 through 10, 10 being the highest.  Pt denies SI, HI and A/V hallucinations. No voiced delusions at this time. Pt alert and oriented x 4. Pt calm and cooperative. No s/s of distress noted. Will continue to monitor.

## 2020-10-09 NOTE — PROGRESS NOTES
Patient did not attend group despite staff encouragement.   Electronically signed by Angela Craig on 10/8/2020 at 9:22 PM

## 2020-10-09 NOTE — PROGRESS NOTES
Zak Lakhani [address] 88[address] FOLLOW-UP NOTE       10/9/2020     Patient was seen and examined in person, Chart reviewed   Patient's case discussed with staff/team    Chief Complaint: Depression SI    Interim History:     Pt slept for 4 hr last night  Pt has been having racing thoughts with mood swings  Going through w/d from loperamide- anxious and fidgety  Passive SI  Hopeless feeling  Has been attending some groups  On ciwa protocol  Appetite:   [x] Normal/Unchanged  [] Increased  [] Decreased      Sleep:       [] Normal/Unchanged  [x] Fair       [] Poor              Energy:    [] Normal/Unchanged  [] Increased  [x] Decreased        SI [x] Present  [] Absent    HI  []Present  [x] Absent     Aggression:  [] yes  [x] no    Patient is [x] able  [] unable to CONTRACT FOR SAFETY     PAST MEDICAL/PSYCHIATRIC HISTORY:   Past Medical History:   Diagnosis Date    Neuropathy        FAMILY/SOCIAL HISTORY:  History reviewed. No pertinent family history.   Social History     Socioeconomic History    Marital status: Single     Spouse name: Not on file    Number of children: Not on file    Years of education: Not on file    Highest education level: Not on file   Occupational History    Not on file   Social Needs    Financial resource strain: Not on file    Food insecurity     Worry: Not on file     Inability: Not on file    Transportation needs     Medical: Not on file     Non-medical: Not on file   Tobacco Use    Smoking status: Current Some Day Smoker     Types: Cigarettes    Smokeless tobacco: Never Used   Substance and Sexual Activity    Alcohol use: Yes     Comment: A 5th a day    Drug use: Not on file    Sexual activity: Not on file   Lifestyle    Physical activity     Days per week: Not on file     Minutes per session: Not on file    Stress: Not on file   Relationships    Social connections     Talks on phone: Not on file     Gets together: Not on file     Attends Druze service: Not on file     Active member of club or organization: Not on file     Attends meetings of clubs or organizations: Not on file     Relationship status: Not on file    Intimate partner violence     Fear of current or ex partner: Not on file     Emotionally abused: Not on file     Physically abused: Not on file     Forced sexual activity: Not on file   Other Topics Concern    Not on file   Social History Narrative    Not on file           ROS:  [x] All negative/unchanged except if checked.  Explain positive(checked items) below:  [] Constitutional  [] Eyes  [] Ear/Nose/Mouth/Throat  [] Respiratory  [] CV  [] GI  []   [] Musculoskeletal  [] Skin/Breast  [] Neurological  [] Endocrine  [] Heme/Lymph  [] Allergic/Immunologic    Explanation:     MEDICATIONS:    Current Facility-Administered Medications:     cariprazine hcl (VRAYLAR) capsule 1.5 mg, 1.5 mg, Oral, Daily, Layne Vidales MD, 1.5 mg at 10/09/20 0835    benztropine (COGENTIN) tablet 1 mg, 1 mg, Oral, BID, Layne Vidales MD, 1 mg at 10/09/20 0835    acetaminophen (TYLENOL) tablet 650 mg, 650 mg, Oral, Q4H PRN, Layne Vidales MD, 650 mg at 10/08/20 2253    aluminum & magnesium hydroxide-simethicone (MAALOX) 200-200-20 MG/5ML suspension 30 mL, 30 mL, Oral, PRN, Layne Vidales MD    benztropine mesylate (COGENTIN) injection 2 mg, 2 mg, Intramuscular, BID PRN, Layne Vidales MD, 2 mg at 10/08/20 1227    haloperidol lactate (HALDOL) injection 5 mg, 5 mg, Intramuscular, Q4H PRN **OR** haloperidol (HALDOL) tablet 5 mg, 5 mg, Oral, Q4H PRN, Layne Vidales MD    hydrOXYzine (ATARAX) tablet 50 mg, 50 mg, Oral, TID PRN, Layne Vidales MD, 50 mg at 10/08/20 2052    magnesium hydroxide (MILK OF MAGNESIA) 400 MG/5ML suspension 30 mL, 30 mL, Oral, Daily PRN, Layne Vidales MD    traZODone (DESYREL) tablet 50 mg, 50 mg, Oral, Nightly PRN, Layne Vidales MD, 50 mg at 10/08/20 2052    polyethylene glycol (GLYCOLAX) packet 17 g, 17 g, Oral, Daily PRN, Marvin Castro MD    promethazine (PHENERGAN) tablet 12.5 mg, 12.5 mg, Oral, Q6H PRN **OR** [DISCONTINUED] ondansetron (ZOFRAN) injection 4 mg, 4 mg, Intravenous, Q6H PRN, Marvin Castro MD    gabapentin (NEURONTIN) capsule 600 mg, 600 mg, Oral, TID, Marvin Castro MD, 600 mg at 10/09/20 0835    magnesium oxide (MAG-OX) tablet 400 mg, 400 mg, Oral, BID, Marvin Castro MD, 400 mg at 10/09/20 6199    multivitamin 1 tablet, 1 tablet, Oral, Daily, Lisa Weaver MD, 1 tablet at 10/09/20 0835    LORazepam (ATIVAN) tablet 0.5 mg, 0.5 mg, Oral, Q4H PRN, 0.5 mg at 10/09/20 0835 **OR** LORazepam (ATIVAN) tablet 1 mg, 1 mg, Oral, Q4H PRN **OR** LORazepam (ATIVAN) tablet 2 mg, 2 mg, Oral, Q2H PRN **OR** LORazepam (ATIVAN) tablet 3 mg, 3 mg, Oral, Q1H PRN **OR** LORazepam (ATIVAN) tablet 4 mg, 4 mg, Oral, Q1H PRN, Lisa Weaver MD    influenza quadrivalent split vaccine (FLUZONE;FLUARIX;FLULAVAL;AFLURIA) injection 0.5 mL, 0.5 mL, Intramuscular, Once, Lisa Weaver MD      Examination:  /76   Pulse 86   Temp 98 °F (36.7 °C) (Oral)   Resp 16   SpO2 98%   Gait - steady  Medication side effects(SE): no    Mental Status Examination:    Level of consciousness:  within normal limits   Appearance:  fair grooming and fair hygiene  Behavior/Motor:  psychomotor retardation  Attitude toward examiner:  cooperative  Speech:  slow   Mood: decreased range and depressed  Affect:  blunted  Thought processes:  coherent   Thought content:  Suicidal Ideation:  passive  Cognition:  oriented to person, place, and time   Concentration poor  Insight poor   Judgement poor     ASSESSMENT:   Patient symptoms are:  [] Well controlled  [] Improving  [] Worsening  [x] No change      Diagnosis:   Active Problems:    [facility]  Resolved Problems:    * No resolved hospital problems.  *  Alcohol and loperamide use disorder    LABS:    Recent Labs     10/06/20  1838   WBC 5.1   HGB 14.6        Recent Labs 10/06/20  1322 10/07/20  0550 10/08/20  0600    137 137   K 4.1 4.8 3.8  3.8    103 102   CO2 29 17* 23   BUN 10 11 12   CREATININE 0.66* 0.57* 0.75   GLUCOSE 114* 99 133*     Recent Labs     10/07/20  0550 10/08/20  0600   BILITOT 0.9* 0.8*   ALKPHOS 69 73   * 105*   * 113*     Lab Results   Component Value Date    LABAMPH Neg 10/05/2020    BARBSCNU Neg 10/05/2020    LABBENZ Neg 10/05/2020    LABMETH Neg 10/05/2020    OPIATESCREENURINE Neg 10/05/2020    PHENCYCLIDINESCREENURINE Neg 10/05/2020    ETOH 81 10/05/2020     Lab Results   Component Value Date    TSH 1.890 10/05/2020     No results found for: LITHIUM  No results found for: VALPROATE, CBMZ    RISK ASSESSMENT: high risk of suicide and impulsivity    Treatment Plan:  Reviewed current Medications with the patient. Medication as ordered  Risks, benefits, side effects, drug-to-drug interactions and alternatives to treatment were discussed. Collateral information:   CD evaluation  Encourage patient to attend group and other milieu activities.   Discharge planning discussed with the patient and treatment team.    PSYCHOTHERAPY/COUNSELING:  [x] Therapeutic interview  [x] Supportive  [] CBT  [] Ongoing  [] Other    [x] Patient continues to need, on a daily basis, active treatment furnished directly by or requiring the supervision of inpatient psychiatric personnel      Anticipated Length of stay:            Electronically signed by Nia Araya MD on 10/9/2020 at 10:15 AM

## 2020-10-09 NOTE — PROGRESS NOTES
Pt requested belonging as they are locked up with security. Retrieved belongings and they are now on 3WT.  Electronically signed by Zbigniew Jeffers RN on 10/9/2020 at 4:17 PM

## 2020-10-10 PROCEDURE — 6370000000 HC RX 637 (ALT 250 FOR IP): Performed by: PSYCHIATRY & NEUROLOGY

## 2020-10-10 PROCEDURE — 1240000000 HC EMOTIONAL WELLNESS R&B

## 2020-10-10 PROCEDURE — 6370000000 HC RX 637 (ALT 250 FOR IP): Performed by: INTERNAL MEDICINE

## 2020-10-10 RX ORDER — NICOTINE 21 MG/24HR
1 PATCH, TRANSDERMAL 24 HOURS TRANSDERMAL DAILY
Status: DISCONTINUED | OUTPATIENT
Start: 2020-10-10 | End: 2020-10-14 | Stop reason: HOSPADM

## 2020-10-10 RX ORDER — BUSPIRONE HYDROCHLORIDE 5 MG/1
5 TABLET ORAL 3 TIMES DAILY
Status: DISCONTINUED | OUTPATIENT
Start: 2020-10-10 | End: 2020-10-11

## 2020-10-10 RX ADMIN — BENZTROPINE MESYLATE 1 MG: 1 TABLET ORAL at 08:09

## 2020-10-10 RX ADMIN — Medication 400 MG: at 08:09

## 2020-10-10 RX ADMIN — GABAPENTIN 600 MG: 300 CAPSULE ORAL at 13:46

## 2020-10-10 RX ADMIN — LORAZEPAM 0.5 MG: 0.5 TABLET ORAL at 12:20

## 2020-10-10 RX ADMIN — BUSPIRONE HYDROCHLORIDE 5 MG: 5 TABLET ORAL at 20:34

## 2020-10-10 RX ADMIN — TRAZODONE HYDROCHLORIDE 100 MG: 100 TABLET ORAL at 22:27

## 2020-10-10 RX ADMIN — BUSPIRONE HYDROCHLORIDE 5 MG: 5 TABLET ORAL at 13:46

## 2020-10-10 RX ADMIN — LORAZEPAM 0.5 MG: 0.5 TABLET ORAL at 16:29

## 2020-10-10 RX ADMIN — Medication 400 MG: at 20:34

## 2020-10-10 RX ADMIN — GABAPENTIN 600 MG: 300 CAPSULE ORAL at 20:34

## 2020-10-10 RX ADMIN — CARIPRAZINE 1.5 MG: 1.5 CAPSULE, GELATIN COATED ORAL at 08:09

## 2020-10-10 RX ADMIN — GABAPENTIN 600 MG: 300 CAPSULE ORAL at 08:09

## 2020-10-10 RX ADMIN — BENZTROPINE MESYLATE 1 MG: 1 TABLET ORAL at 20:33

## 2020-10-10 RX ADMIN — LORAZEPAM 0.5 MG: 0.5 TABLET ORAL at 22:27

## 2020-10-10 RX ADMIN — THERA TABS 1 TABLET: TAB at 08:09

## 2020-10-10 NOTE — GROUP NOTE
Group Therapy Note    Date: 10/10/2020    Group Start Time: 1830  Group End Time: 1900  Group Topic: Recreational    [facility]    Cecily Loco        Group Therapy Note    Attendees: 11/19       Patient's Goal:  To participate in the Activity Group's game of [address]. Notes:  Patient quietly observed the Activity Group.     Status After Intervention:  Unchanged    Participation Level: None    Participation Quality: Appropriate      Speech:  mute      Thought Process/Content: Hallucinating      Affective Functioning: Incongruent      Mood: anxious      Level of consciousness:  Preoccupied      Response to Learning: Resistant      Endings: None Reported    Modes of Intervention: Activity      Discipline Responsible: [address], [employer]ore[employer]      Signature:  Cecily Loco

## 2020-10-10 NOTE — GROUP NOTE
Group Therapy Note    Date: 10/10/2020    Group Start Time: 1000  Group End Time: 1100  Group Topic: Psychoeducation    [facility] MARCIN Pruitt        Group Therapy Note    Attendees: 10         Patient's Goal:  \"to feel better\"    Notes:  Pt. attended the 1000 skill group. Pt. was calm and talkative. Engaged in the group discussion. Relaxed and creative. Status After Intervention:  Improved    Participation Level:  Active Listener and Interactive    Participation Quality: Appropriate, Attentive and Sharing      Speech:  normal      Thought Process/Content: Logical      Affective Functioning: Congruent      Mood: calm      Level of consciousness:  Alert, Oriented x4 and Attentive      Response to Learning: Progressing to goal      Endings: None Reported    Modes of Intervention: Education, Support, Socialization and Activity      Discipline Responsible: Psychoeducational Specialist      Signature:  Hanny Abdalla

## 2020-10-10 NOTE — PROGRESS NOTES
Pt. attended the 0900 community meeting.  Electronically signed by Jarred Redmond on 10/10/2020 at 9:47 AM

## 2020-10-10 NOTE — PROGRESS NOTES
Patient did not attend group despite staff encouragement.   Electronically signed by Guero Rose on 10/10/2020 at 5:36 PM

## 2020-10-10 NOTE — GROUP NOTE
Group Therapy Note    Date: 10/10/2020    Group Start Time: 1400  Group End Time: 4400  Group Topic: Recovery    [facility]    LORENA Perez        Group Therapy Note    Attendees: 9         Patient's Goal:  Learn and discuss grief and Loss. Notes:  Patient listened for a while. He did not share any experiences and left the group.     Status After Intervention:  Unchanged    Participation Level: None    Participation Quality: Resistant      Speech:  mute      Thought Process/Content: Perseverating      Affective Functioning: Blunted      Mood: depressed      Level of consciousness:  Alert      Response to Learning: Resistant      Endings: None Reported    Modes of Intervention: Education, Support and Socialization      Discipline Responsible: Registered Nurse      Signature:  LORENA Perez

## 2020-10-10 NOTE — PROGRESS NOTES
Pt assessment completed pt noted to be sweaty, denies AVH, noted to be talking to himself in his room alone, pt asking where his swiffer went,  Pt denies SI HI, pt reports he only sees spiders and webs at night. Nothing during the day. Pt appears unkempt. Verbalized having racing thoughts, pt verbalized he thinks the sweating is from the new medication the doctor started him on, verbalized he will speak to the doctor today. Pt appears unable to concentrate at times through out the assessment.

## 2020-10-10 NOTE — PROGRESS NOTES
Used   Substance and Sexual Activity    Alcohol use: Yes     Comment: A 5th a day    Drug use: Not on file    Sexual activity: Not on file   Lifestyle    Physical activity     Days per week: Not on file     Minutes per session: Not on file    Stress: Not on file   Relationships    Social connections     Talks on phone: Not on file     Gets together: Not on file     Attends Taoism service: Not on file     Active member of club or organization: Not on file     Attends meetings of clubs or organizations: Not on file     Relationship status: Not on file    Intimate partner violence     Fear of current or ex partner: Not on file     Emotionally abused: Not on file     Physically abused: Not on file     Forced sexual activity: Not on file   Other Topics Concern    Not on file   Social History Narrative    Not on file           ROS:  [x] All negative/unchanged except if checked.  Explain positive(checked items) below:  [] Constitutional  [] Eyes  [] Ear/Nose/Mouth/Throat  [] Respiratory  [] CV  [] GI  []   [] Musculoskeletal  [] Skin/Breast  [] Neurological  [] Endocrine  [] Heme/Lymph  [] Allergic/Immunologic    Explanation:     MEDICATIONS:    Current Facility-Administered Medications:     busPIRone (BUSPAR) tablet 5 mg, 5 mg, Oral, TID, Radha Colunga MD, 5 mg at 10/10/20 1346    nicotine (NICODERM CQ) 14 MG/24HR 1 patch, 1 patch, Transdermal, Daily, Radha Colunga MD, 1 patch at 10/10/20 1220    traZODone (DESYREL) tablet 100 mg, 100 mg, Oral, Nightly PRN, Riley Steward MD, 100 mg at 10/09/20 2128    cariprazine hcl (VRAYLAR) capsule 1.5 mg, 1.5 mg, Oral, Daily, Riley Steward MD, 1.5 mg at 10/10/20 0809    benztropine (COGENTIN) tablet 1 mg, 1 mg, Oral, BID, Riley Steward MD, 1 mg at 10/10/20 0809    acetaminophen (TYLENOL) tablet 650 mg, 650 mg, Oral, Q4H PRN, Riley Steward MD, 650 mg at 10/08/20 2253    aluminum & magnesium hydroxide-simethicone ([address]) 631-027-08 MG/5ML suspension 30 mL, 30 mL, Oral, PRN, Eugenio Ambriz MD    benztropine mesylate (COGENTIN) injection 2 mg, 2 mg, Intramuscular, BID PRN, Eugenio Ambriz MD, 2 mg at 10/08/20 1227    haloperidol lactate (HALDOL) injection 5 mg, 5 mg, Intramuscular, Q4H PRN **OR** haloperidol (HALDOL) tablet 5 mg, 5 mg, Oral, Q4H PRN, Eugenio Ambriz MD, 5 mg at 10/09/20 2348    hydrOXYzine (ATARAX) tablet 50 mg, 50 mg, Oral, TID PRN, Eugenio Ambriz MD, 50 mg at 10/08/20 2052    magnesium hydroxide (MILK OF MAGNESIA) 400 MG/5ML suspension 30 mL, 30 mL, Oral, Daily PRN, Eugenio Ambriz MD    polyethylene glycol (GLYCOLAX) packet 17 g, 17 g, Oral, Daily PRN, Lacey Vickers MD    promethazine (PHENERGAN) tablet 12.5 mg, 12.5 mg, Oral, Q6H PRN **OR** [DISCONTINUED] ondansetron (ZOFRAN) injection 4 mg, 4 mg, Intravenous, Q6H PRN, Lacey Vickers MD    gabapentin (NEURONTIN) capsule 600 mg, 600 mg, Oral, TID, Lacey Vickers MD, 600 mg at 10/10/20 1346    magnesium oxide (MAG-OX) tablet 400 mg, 400 mg, Oral, BID, Lacey Vickers MD, 400 mg at 10/10/20 0809    multivitamin 1 tablet, 1 tablet, Oral, Daily, Eugenio Ambriz MD, 1 tablet at 10/10/20 0809    LORazepam (ATIVAN) tablet 0.5 mg, 0.5 mg, Oral, Q4H PRN, 0.5 mg at 10/10/20 1220 **OR** LORazepam (ATIVAN) tablet 1 mg, 1 mg, Oral, Q4H PRN **OR** LORazepam (ATIVAN) tablet 2 mg, 2 mg, Oral, Q2H PRN **OR** LORazepam (ATIVAN) tablet 3 mg, 3 mg, Oral, Q1H PRN **OR** LORazepam (ATIVAN) tablet 4 mg, 4 mg, Oral, Q1H PRN, Eugenio Ambriz MD    influenza quadrivalent split vaccine (FLUZONE;FLUARIX;FLULAVAL;AFLURIA) injection 0.5 mL, 0.5 mL, Intramuscular, Once, Eugenio Ambriz MD      Examination:  /77   Pulse 80   Temp 98 °F (36.7 °C)   Resp 16   SpO2 100%   Gait - steady  Medication side effects(SE): no    Mental Status Examination:    Level of consciousness:  within normal limits   Appearance:  fair grooming and fair hygiene  Behavior/Motor:  psychomotor retardation, improving  Attitude toward examiner:  cooperative  Speech:  slow   Mood: decreased range and depressed  Affect:  blunted  Thought processes:  coherent   Thought content:  Suicidal Ideation:  Denies now  Cognition:  oriented to person, place, and time   Concentration poor  Insight poor   Judgement poor     ASSESSMENT:   Patient symptoms are:  [x] Well controlled  [x] Improving  [] Worsening  [] No change      Diagnosis:   Active Problems:    Bipolar depression ([facility]  Resolved Problems:    * No resolved hospital problems. *  Alcohol and loperamide use disorder    LABS:    No results for input(s): WBC, HGB, PLT in the last 72 hours. Recent Labs     10/08/20  0600 10/09/20  0813    135   K 3.8  3.8 4.6    102   CO2 23 13*   BUN 12 11   CREATININE 0.75 0.71   GLUCOSE 133* 104*     Recent Labs     10/08/20  0600 10/09/20  0813   BILITOT 0.8* 0.7   ALKPHOS 73 67   * 101*   * 111*     Lab Results   Component Value Date    LABAMPH Neg 10/05/2020    BARBSCNU Neg 10/05/2020    LABBENZ Neg 10/05/2020    LABMETH Neg 10/05/2020    OPIATESCREENURINE Neg 10/05/2020    PHENCYCLIDINESCREENURINE Neg 10/05/2020    ETOH 81 10/05/2020     Lab Results   Component Value Date    TSH 1.890 10/05/2020     No results found for: LITHIUM  No results found for: VALPROATE, CBMZ    RISK ASSESSMENT: high risk of suicide and impulsivity    Treatment Plan:  Reviewed current Medications with the patient. Medication as ordered  Risks, benefits, side effects, drug-to-drug interactions and alternatives to treatment were discussed. Collateral information:   CD evaluation  Encourage patient to attend group and other milieu activities.   Discharge planning discussed with the patient and treatment team.    PSYCHOTHERAPY/COUNSELING:  [x] Therapeutic interview  [x] Supportive  [] CBT  [] Ongoing  [] Other    [x] Patient continues to need, on a daily basis, active treatment furnished directly by or requiring the supervision of inpatient psychiatric personnel      Anticipated Length of stay:            Electronically signed by Brionna Shi MD on 10/10/2020 at 3:54 PM

## 2020-10-10 NOTE — PROGRESS NOTES
Patient did not attend group despite staff encouragement.   Electronically signed by Pradip Smith on 10/9/2020 at 9:26 PM

## 2020-10-10 NOTE — PROGRESS NOTES
Patient complaining of inability to sleep due to racing thoughts, he was digging through his garbage and had his bed tore apart because he reported seeing bugs in trash and in the bed sheets. Patient re-oriented to surroundings and accepted PRN Haldol. Previous CIWA score was 3 and vitals WNL. At the time of CIWA patient made no mention of visual hallucinations.

## 2020-10-10 NOTE — PROGRESS NOTES
Remains awake in room ,taking sheets off clean bed in room. Pt denies seeing bugs at this time,reports,''i just cant sleep.'' encouraged pt to try to lie in bed.

## 2020-10-11 LAB
ALBUMIN SERPL-MCNC: 4.6 G/DL (ref 3.5–4.6)
ALP BLD-CCNC: 66 U/L (ref 35–104)
ALT SERPL-CCNC: 101 U/L (ref 0–41)
ANION GAP SERPL CALCULATED.3IONS-SCNC: 14 MEQ/L (ref 9–15)
AST SERPL-CCNC: 62 U/L (ref 0–40)
BACTERIA: NEGATIVE /HPF
BASOPHILS ABSOLUTE: 0 K/UL (ref 0–0.2)
BASOPHILS RELATIVE PERCENT: 0.4 %
BILIRUB SERPL-MCNC: 0.6 MG/DL (ref 0.2–0.7)
BILIRUBIN URINE: ABNORMAL
BLOOD, URINE: NEGATIVE
BUN BLDV-MCNC: 20 MG/DL (ref 6–20)
CALCIUM SERPL-MCNC: 9.7 MG/DL (ref 8.5–9.9)
CHLORIDE BLD-SCNC: 102 MEQ/L (ref 95–107)
CLARITY: CLEAR
CO2: 20 MEQ/L (ref 20–31)
COLOR: ABNORMAL
CREAT SERPL-MCNC: 0.89 MG/DL (ref 0.7–1.2)
EKG ATRIAL RATE: 65 BPM
EKG P AXIS: 58 DEGREES
EKG P-R INTERVAL: 142 MS
EKG Q-T INTERVAL: 448 MS
EKG QRS DURATION: 90 MS
EKG QTC CALCULATION (BAZETT): 465 MS
EKG R AXIS: -65 DEGREES
EKG T AXIS: -12 DEGREES
EKG VENTRICULAR RATE: 65 BPM
EOSINOPHILS ABSOLUTE: 0 K/UL (ref 0–0.7)
EOSINOPHILS RELATIVE PERCENT: 0 %
EPITHELIAL CELLS, UA: NORMAL /HPF (ref 0–5)
GFR AFRICAN AMERICAN: >60
GFR NON-AFRICAN AMERICAN: >60
GLOBULIN: 3.3 G/DL (ref 2.3–3.5)
GLUCOSE BLD-MCNC: 117 MG/DL (ref 70–99)
GLUCOSE URINE: NEGATIVE MG/DL
HCT VFR BLD CALC: 42.1 % (ref 42–52)
HEMOGLOBIN: 14.1 G/DL (ref 14–18)
HYALINE CASTS: NORMAL /HPF (ref 0–5)
KETONES, URINE: ABNORMAL MG/DL
LEUKOCYTE ESTERASE, URINE: ABNORMAL
LYMPHOCYTES ABSOLUTE: 2.3 K/UL (ref 1–4.8)
LYMPHOCYTES RELATIVE PERCENT: 21.9 %
MCH RBC QN AUTO: 33.1 PG (ref 27–31.3)
MCHC RBC AUTO-ENTMCNC: 33.6 % (ref 33–37)
MCV RBC AUTO: 98.5 FL (ref 80–100)
MONOCYTES ABSOLUTE: 1 K/UL (ref 0.2–0.8)
MONOCYTES RELATIVE PERCENT: 9.5 %
NEUTROPHILS ABSOLUTE: 7.2 K/UL (ref 1.4–6.5)
NEUTROPHILS RELATIVE PERCENT: 68.2 %
NITRITE, URINE: NEGATIVE
PDW BLD-RTO: 13.4 % (ref 11.5–14.5)
PH UA: 6 (ref 5–9)
PLATELET # BLD: 304 K/UL (ref 130–400)
POTASSIUM SERPL-SCNC: 4.1 MEQ/L (ref 3.4–4.9)
PROTEIN UA: ABNORMAL MG/DL
RBC # BLD: 4.27 M/UL (ref 4.7–6.1)
RBC UA: NORMAL /HPF (ref 0–5)
SODIUM BLD-SCNC: 136 MEQ/L (ref 135–144)
SPECIFIC GRAVITY UA: 1.03 (ref 1–1.03)
TOTAL PROTEIN: 7.9 G/DL (ref 6.3–8)
URINE REFLEX TO CULTURE: ABNORMAL
UROBILINOGEN, URINE: 0.2 E.U./DL
WBC # BLD: 10.5 K/UL (ref 4.8–10.8)
WBC UA: NORMAL /HPF (ref 0–5)

## 2020-10-11 PROCEDURE — 85025 COMPLETE CBC W/AUTO DIFF WBC: CPT

## 2020-10-11 PROCEDURE — 80053 COMPREHEN METABOLIC PANEL: CPT

## 2020-10-11 PROCEDURE — 93005 ELECTROCARDIOGRAM TRACING: CPT | Performed by: PSYCHIATRY & NEUROLOGY

## 2020-10-11 PROCEDURE — 6370000000 HC RX 637 (ALT 250 FOR IP): Performed by: INTERNAL MEDICINE

## 2020-10-11 PROCEDURE — 1240000000 HC EMOTIONAL WELLNESS R&B

## 2020-10-11 PROCEDURE — 36415 COLL VENOUS BLD VENIPUNCTURE: CPT

## 2020-10-11 PROCEDURE — 6370000000 HC RX 637 (ALT 250 FOR IP): Performed by: PSYCHIATRY & NEUROLOGY

## 2020-10-11 PROCEDURE — 81001 URINALYSIS AUTO W/SCOPE: CPT

## 2020-10-11 RX ORDER — CHLORDIAZEPOXIDE HYDROCHLORIDE 10 MG/1
10 CAPSULE, GELATIN COATED ORAL EVERY 4 HOURS PRN
Status: DISCONTINUED | OUTPATIENT
Start: 2020-10-11 | End: 2020-10-13

## 2020-10-11 RX ORDER — CHLORDIAZEPOXIDE HYDROCHLORIDE 25 MG/1
50 CAPSULE, GELATIN COATED ORAL
Status: DISCONTINUED | OUTPATIENT
Start: 2020-10-11 | End: 2020-10-13

## 2020-10-11 RX ORDER — CHLORDIAZEPOXIDE HYDROCHLORIDE 25 MG/1
75 CAPSULE, GELATIN COATED ORAL
Status: DISCONTINUED | OUTPATIENT
Start: 2020-10-11 | End: 2020-10-13

## 2020-10-11 RX ORDER — LORAZEPAM 1 MG/1
4 TABLET ORAL
Status: DISCONTINUED | OUTPATIENT
Start: 2020-10-11 | End: 2020-10-13

## 2020-10-11 RX ORDER — CHLORDIAZEPOXIDE HYDROCHLORIDE 25 MG/1
25 CAPSULE, GELATIN COATED ORAL EVERY 4 HOURS PRN
Status: DISCONTINUED | OUTPATIENT
Start: 2020-10-11 | End: 2020-10-13

## 2020-10-11 RX ORDER — FOLIC ACID 1 MG/1
1 TABLET ORAL DAILY
Status: DISCONTINUED | OUTPATIENT
Start: 2020-10-11 | End: 2020-10-14 | Stop reason: HOSPADM

## 2020-10-11 RX ORDER — THIAMINE MONONITRATE (VIT B1) 100 MG
100 TABLET ORAL DAILY
Status: DISCONTINUED | OUTPATIENT
Start: 2020-10-11 | End: 2020-10-14 | Stop reason: HOSPADM

## 2020-10-11 RX ADMIN — Medication 100 MG: at 13:03

## 2020-10-11 RX ADMIN — LORAZEPAM 3 MG: 1 TABLET ORAL at 05:25

## 2020-10-11 RX ADMIN — CHLORDIAZEPOXIDE HYDROCHLORIDE 50 MG: 25 CAPSULE ORAL at 13:03

## 2020-10-11 RX ADMIN — GABAPENTIN 600 MG: 300 CAPSULE ORAL at 20:00

## 2020-10-11 RX ADMIN — FOLIC ACID 1 MG: 1 TABLET ORAL at 13:03

## 2020-10-11 RX ADMIN — HALOPERIDOL 5 MG: 5 TABLET ORAL at 05:25

## 2020-10-11 RX ADMIN — Medication 400 MG: at 20:00

## 2020-10-11 RX ADMIN — LORAZEPAM 2 MG: 1 TABLET ORAL at 02:29

## 2020-10-11 NOTE — BH NOTE
Client is resting/sleeping at this time with intermittent awake periods. While client is awake, he is still confused, alert and oriented x 1, nonsensical yet cooperative, mildly anxious, med compliant.

## 2020-10-11 NOTE — PROGRESS NOTES
Patient did not attend group despite staff encouragement.   Electronically signed by Anupama Wiggins on 10/11/2020 at 5:24 PM

## 2020-10-11 NOTE — PROGRESS NOTES
Pt. remains on 1:1 with staff and is not appropriate for the 1000 skill group at this time.  Electronically signed by Sanna Joseph on 10/11/2020 at 10:58 AM

## 2020-10-11 NOTE — PROGRESS NOTES
Zak Lakhani [facility] 89. FOLLOW-UP NOTE     THE PATIENT WAS SEEN THROUGH TELEPSYCHIATRY, IN A REAL-TIME, AUDIO-VIDEO ENCOUNTER, WITH THE PATIENT IN [place], [place] AND THE PROVIDER IN [place], [place]    10/11/2020     Patient was seen and examined in person, Chart reviewed   Patient's case discussed with staff/team    Chief Complaint: Depression SI    Interim History:     Patient has been increasingly delirious, disorganized, confused, hallucinating overnight, tremulous. He was reported to be very bizarre, with dilated pupils, and hallucinating. He was up all night, and was medicated. Staff reports that Ativan seems to worsen his mental state. He is disoriented to place, disorganized, with pressured speech, at times non-intelligible. He has visual hallucinations, of bugs on his toes, and demons. He knows the month and year, but not how long he has been here. Appetite:   [] Normal/Unchanged  [] Increased  [x] Decreased      Sleep:       [] Normal/Unchanged  [] Fair       [x] Poor              Energy:    [] Normal/Unchanged  [] Increased  [x] Decreased        SI [] Present  [x] Absent    HI  []Present  [x] Absent     Aggression:  [] yes  [x] no    Patient is [] able  [x] unable to CONTRACT FOR SAFETY     PAST MEDICAL/PSYCHIATRIC HISTORY:   Past Medical History:   Diagnosis Date    Neuropathy        FAMILY/SOCIAL HISTORY:  History reviewed. No pertinent family history.   Social History     Socioeconomic History    Marital status: Single     Spouse name: Not on file    Number of children: Not on file    Years of education: Not on file    Highest education level: Not on file   Occupational History    Not on file   Social Needs    Financial resource strain: Not on file    Food insecurity     Worry: Not on file     Inability: Not on file    Transportation needs     Medical: Not on file     Non-medical: Not on file   Tobacco Use    Smoking status: Current Some Day Smoker     Types: Cigarettes    Smokeless tobacco: Never Used   Substance and Sexual Activity    Alcohol use: Yes     Comment: A 5th a day    Drug use: Not on file    Sexual activity: Not on file   Lifestyle    Physical activity     Days per week: Not on file     Minutes per session: Not on file    Stress: Not on file   Relationships    Social connections     Talks on phone: Not on file     Gets together: Not on file     Attends Nondenominational service: Not on file     Active member of club or organization: Not on file     Attends meetings of clubs or organizations: Not on file     Relationship status: Not on file    Intimate partner violence     Fear of current or ex partner: Not on file     Emotionally abused: Not on file     Physically abused: Not on file     Forced sexual activity: Not on file   Other Topics Concern    Not on file   Social History Narrative    Not on file           ROS:  [x] All negative/unchanged except if checked.  Explain positive(checked items) below:  [] Constitutional  [] Eyes  [] Ear/Nose/Mouth/Throat  [] Respiratory  [] CV  [] GI  []   [] Musculoskeletal  [] Skin/Breast  [] Neurological  [] Endocrine  [] Heme/Lymph  [] Allergic/Immunologic    Explanation:     MEDICATIONS:    Current Facility-Administered Medications:     chlordiazePOXIDE (LIBRIUM) capsule 10 mg, 10 mg, Oral, Q4H PRN **OR** chlordiazePOXIDE (LIBRIUM) capsule 25 mg, 25 mg, Oral, Q4H PRN **OR** chlordiazePOXIDE (LIBRIUM) capsule 50 mg, 50 mg, Oral, Q2H PRN, 50 mg at 10/11/20 1303 **OR** chlordiazePOXIDE (LIBRIUM) capsule 75 mg, 75 mg, Oral, Q1H PRN **OR** LORazepam (ATIVAN) tablet 4 mg, 4 mg, Oral, Q1H PRN, Suad Doe MD    vitamin B-1 (THIAMINE) tablet 100 mg, 100 mg, Oral, Daily, Suad Doe MD, 100 mg at 80/17/97 1550    folic acid (FOLVITE) tablet 1 mg, 1 mg, Oral, Daily, Suad Doe MD, 1 mg at 10/11/20 1303    nicotine (NICODERM CQ) 14 MG/24HR 1 patch, 1 patch, Transdermal, Daily, Suad Doe MD, Stopped at 10/11/20 1238   traZODone (DESYREL) tablet 100 mg, 100 mg, Oral, Nightly PRN, Dia Webb MD, 100 mg at 10/10/20 2227    acetaminophen (TYLENOL) tablet 650 mg, 650 mg, Oral, Q4H PRN, Dia Webb MD, 650 mg at 10/08/20 2253    aluminum & magnesium hydroxide-simethicone (MAALOX) 200-200-20 MG/5ML suspension 30 mL, 30 mL, Oral, PRN, Dia Webb MD    haloperidol lactate (HALDOL) injection 5 mg, 5 mg, Intramuscular, Q4H PRN **OR** haloperidol (HALDOL) tablet 5 mg, 5 mg, Oral, Q4H PRN, Dia Webb MD, 5 mg at 10/11/20 0525    hydrOXYzine (ATARAX) tablet 50 mg, 50 mg, Oral, TID PRN, Dia Webb MD, 50 mg at 10/08/20 2052    magnesium hydroxide (MILK OF MAGNESIA) 400 MG/5ML suspension 30 mL, 30 mL, Oral, Daily PRN, Dia Webb MD    polyethylene glycol (GLYCOLAX) packet 17 g, 17 g, Oral, Daily PRN, Shabnam Horne MD    gabapentin (NEURONTIN) capsule 600 mg, 600 mg, Oral, TID, Shabnam Horne MD, Stopped at 10/11/20 0900    magnesium oxide (MAG-OX) tablet 400 mg, 400 mg, Oral, BID, Shabnam Horne MD, Stopped at 10/11/20 1237    multivitamin 1 tablet, 1 tablet, Oral, Daily, Dia Webb MD, Stopped at 10/11/20 1238    influenza quadrivalent split vaccine (FLUZONE;FLUARIX;FLULAVAL;AFLURIA) injection 0.5 mL, 0.5 mL, Intramuscular, Once, Dia Webb MD, Stopped at 10/11/20 1330      Examination:  /78   Pulse 93   Temp 97 °F (36.1 °C) (Oral)   Resp 18   SpO2 98%   Gait - steady  Medication side effects(SE): no    Mental Status Examination:    Level of consciousness:  awake  Appearance: poor grooming and poor hygiene  Behavior/Motor:  psychomotor restlessness  Attitude toward examiner: limitedly cooperative  Speech:  Slow, slurred, difficult to understand  Mood: decreased range and depressed  Affect:  blunted  Thought processes:  incoherent   Thought content:  Suicidal Ideation: cannot be assessed  Cognition:  disoriented to place, and time frames  Concentration poor  Insight poor Judgement poor     ASSESSMENT:   Patient symptoms are:  [] Well controlled  [] Improving  [x] Worsening  [] No change      Diagnosis:      Delirium, unclear etiology, r/o due to alcohol withdrawal    Active Problems:    Bipolar depression ([facility] [address]  Resolved Problems:    * No resolved hospital problems. *  Alcohol and loperamide use disorder    LABS:    Recent Labs     10/11/20  1328   WBC 10.5   HGB 14.1        Recent Labs     10/09/20  0813 10/11/20  1328    136   K 4.6 4.1    102   CO2 13* 20   BUN 11 20   CREATININE 0.71 0.89   GLUCOSE 104* 117*     Recent Labs     10/09/20  0813 10/11/20  1328   BILITOT 0.7 0.6   ALKPHOS 67 66   * 62*   * 101*     Lab Results   Component Value Date    LABAMPH Neg 10/05/2020    BARBSCNU Neg 10/05/2020    LABBENZ Neg 10/05/2020    LABMETH Neg 10/05/2020    OPIATESCREENURINE Neg 10/05/2020    PHENCYCLIDINESCREENURINE Neg 10/05/2020    ETOH 81 10/05/2020     Lab Results   Component Value Date    TSH 1.890 10/05/2020     No results found for: LITHIUM  No results found for: VALPROATE, CBMZ    RISK ASSESSMENT: high risk of suicide and impulsivity    Treatment Plan:  Reviewed current Medications with the patient. Medication as ordered. 1:1 for safety  Risks, benefits, side effects, drug-to-drug interactions and alternatives to treatment were discussed. Collateral information:   CD evaluation  Encourage patient to attend group and other milieu activities.   Discharge planning discussed with the patient and treatment team.    PSYCHOTHERAPY/COUNSELING:  [x] Therapeutic interview  [x] Supportive  [] CBT  [] Ongoing  [] Other    [x] Patient continues to need, on a daily basis, active treatment furnished directly by or requiring the supervision of inpatient psychiatric personnel      Anticipated Length of stay:            Electronically signed by Brionna Shi MD on 10/11/2020 at 4:20 PM

## 2020-10-11 NOTE — BH NOTE
Client is showing sign of tiredness, less impulsive, still confused, disoriented, bizarre, internally stimulated, obsessed with bolted down hardwares, picking up imaginary objects from floor, and having word salads, and loose associations.  Continue monitor
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
## 2020-10-11 NOTE — FLOWSHEET NOTE
Updated Dr. Lynne Bishop of pt CIWA and possible effects reported from ativan. New order to change to Librium protocol and discontinue Ativan protocol.

## 2020-10-11 NOTE — PROGRESS NOTES
Patient remains awake in assigned room, pacing. Pt restless. Pt complains of mild anxiety and mild headache. Pt tremulous and appears slightly sweaty. Pt accepted Ativan 2 mg PO for score of 11 on CIWA protocol. Pt declines offered tylenol. Will continue to monitor.  Electronically signed by Steve Head RN on 10/11/20 at 2:35 AM EDT

## 2020-10-11 NOTE — BH NOTE
Client remains on 1-1, alert and oriented x1, Client appears to be internally stimulated, psychotic, yet cooperative with assertive redirection. Client's speech is word salad, no comprehension of reality. Continue monitor.
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
## 2020-10-11 NOTE — PROGRESS NOTES
Pt bit the string off the mask and started to chew the string. Pt instructed to spit out the string pt did so, pt took off pants and standing in door of seclusion room, pt instructed to put pants back on pt did so. Pt remains one to one for safety.

## 2020-10-11 NOTE — PROGRESS NOTES
Patient mumbling incoherently. Pt kneeling on floor at times. Pt climbed on top of sink and had to be directed down. When asked questions pt attempted to put his blanket on as if they were pants. Pt hallucinating, attempting to grab at stuff. Unorganized speech. Pt slightly tremulous/sweaty. Pt restless. Call placed to Dr Emma Sahu regarding CIWA score of 15. This nurse informed Dr. Emma Sahu that patient seems worse since receiving ativan at [phone]. Received new orders from ARI Nieto, discontinue cogentin, and have hospitalist assess pt and decide if pt should get ativan again. Hospitalist-Jeff Renner NP notified of psychiatrist request. Pt remains on 1:1 observation.  Electronically signed by Yanira Madrigal RN on 10/11/20 at 5:06 AM EDT

## 2020-10-11 NOTE — PROGRESS NOTES
Patient complaining of feeling clammy, anxious, and seeing \"circles in the air. \" Pt appears slightly clammy and restless. Pt scored 5 on CIWA and given ativan 0.5 mg PO. Pt also accepted PRN Trazodone for sleep.  Electronically signed by Mariane Mohs, RN on 10/10/20 at 10:31 PM EDT

## 2020-10-11 NOTE — BH NOTE
Client is still appears to be psychotic, delusional, client stated that he used to be \"[place]\" . Client was able to drink 240 cc liquid and produce about 15 cc dark urine and that urine was collected. Client is internally stimulated, acting bizarre, reports \"demons in bird forms\" and reports \"bugs\" on his toes. Client is oriented to self only.  Continue monitor
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
## 2020-10-12 LAB
ALBUMIN SERPL-MCNC: 4.1 G/DL (ref 3.5–4.6)
ALP BLD-CCNC: 67 U/L (ref 35–104)
ALT SERPL-CCNC: 85 U/L (ref 0–41)
ANION GAP SERPL CALCULATED.3IONS-SCNC: 10 MEQ/L (ref 9–15)
AST SERPL-CCNC: 53 U/L (ref 0–40)
BILIRUB SERPL-MCNC: 0.8 MG/DL (ref 0.2–0.7)
BUN BLDV-MCNC: 17 MG/DL (ref 6–20)
CALCIUM SERPL-MCNC: 8.9 MG/DL (ref 8.5–9.9)
CHLORIDE BLD-SCNC: 104 MEQ/L (ref 95–107)
CO2: 24 MEQ/L (ref 20–31)
CREAT SERPL-MCNC: 0.78 MG/DL (ref 0.7–1.2)
GFR AFRICAN AMERICAN: >60
GFR NON-AFRICAN AMERICAN: >60
GLOBULIN: 2.8 G/DL (ref 2.3–3.5)
GLUCOSE BLD-MCNC: 105 MG/DL (ref 70–99)
POTASSIUM SERPL-SCNC: 4 MEQ/L (ref 3.4–4.9)
SODIUM BLD-SCNC: 138 MEQ/L (ref 135–144)
TOTAL PROTEIN: 6.9 G/DL (ref 6.3–8)

## 2020-10-12 PROCEDURE — 80053 COMPREHEN METABOLIC PANEL: CPT

## 2020-10-12 PROCEDURE — 6370000000 HC RX 637 (ALT 250 FOR IP): Performed by: PSYCHIATRY & NEUROLOGY

## 2020-10-12 PROCEDURE — 1240000000 HC EMOTIONAL WELLNESS R&B

## 2020-10-12 PROCEDURE — 6370000000 HC RX 637 (ALT 250 FOR IP): Performed by: INTERNAL MEDICINE

## 2020-10-12 PROCEDURE — 99232 SBSQ HOSP IP/OBS MODERATE 35: CPT | Performed by: PSYCHIATRY & NEUROLOGY

## 2020-10-12 PROCEDURE — 36415 COLL VENOUS BLD VENIPUNCTURE: CPT

## 2020-10-12 RX ORDER — OXCARBAZEPINE 150 MG/1
150 TABLET, FILM COATED ORAL 2 TIMES DAILY
Status: DISCONTINUED | OUTPATIENT
Start: 2020-10-12 | End: 2020-10-14 | Stop reason: HOSPADM

## 2020-10-12 RX ADMIN — THERA TABS 1 TABLET: TAB at 12:07

## 2020-10-12 RX ADMIN — OXCARBAZEPINE 150 MG: 150 TABLET, FILM COATED ORAL at 21:16

## 2020-10-12 RX ADMIN — FOLIC ACID 1 MG: 1 TABLET ORAL at 12:07

## 2020-10-12 RX ADMIN — Medication 400 MG: at 12:07

## 2020-10-12 RX ADMIN — GABAPENTIN 600 MG: 300 CAPSULE ORAL at 21:16

## 2020-10-12 RX ADMIN — OXCARBAZEPINE 150 MG: 150 TABLET, FILM COATED ORAL at 13:36

## 2020-10-12 RX ADMIN — GABAPENTIN 600 MG: 300 CAPSULE ORAL at 12:07

## 2020-10-12 RX ADMIN — TRAZODONE HYDROCHLORIDE 100 MG: 100 TABLET ORAL at 22:19

## 2020-10-12 RX ADMIN — Medication 100 MG: at 12:07

## 2020-10-12 RX ADMIN — Medication 400 MG: at 21:16

## 2020-10-12 RX ADMIN — GABAPENTIN 600 MG: 300 CAPSULE ORAL at 17:21

## 2020-10-12 NOTE — PROGRESS NOTES
Zak Lakhani [facility] 89. FOLLOW-UP NOTE       10/12/2020     Patient was seen and examined in person, Chart reviewed   Patient's case discussed with staff/team    Chief Complaint: Depression SI    Interim History:     Pt was markedly confused and hallucinating over the weekend  Clearing up since this morning  Was on 1 to 1  Less depressed or mood swings  Able to think clearly  Felt like a dream like state    Appetite:   [x] Normal/Unchanged  [] Increased  [] Decreased      Sleep:       [] Normal/Unchanged  [x] Fair       [] Poor              Energy:    [] Normal/Unchanged  [] Increased  [x] Decreased        SI [x] Present  [] Absent    HI  []Present  [x] Absent     Aggression:  [] yes  [x] no    Patient is [x] able  [] unable to CONTRACT FOR SAFETY     PAST MEDICAL/PSYCHIATRIC HISTORY:   Past Medical History:   Diagnosis Date    Neuropathy        FAMILY/SOCIAL HISTORY:  History reviewed. No pertinent family history.   Social History     Socioeconomic History    Marital status: Single     Spouse name: Not on file    Number of children: Not on file    Years of education: Not on file    Highest education level: Not on file   Occupational History    Not on file   Social Needs    Financial resource strain: Not on file    Food insecurity     Worry: Not on file     Inability: Not on file    Transportation needs     Medical: Not on file     Non-medical: Not on file   Tobacco Use    Smoking status: Current Some Day Smoker     Types: Cigarettes    Smokeless tobacco: Never Used   Substance and Sexual Activity    Alcohol use: Yes     Comment: A 5th a day    Drug use: Not on file    Sexual activity: Not on file   Lifestyle    Physical activity     Days per week: Not on file     Minutes per session: Not on file    Stress: Not on file   Relationships    Social connections     Talks on phone: Not on file     Gets together: Not on file     Attends Yazidi service: Not on file     Active member of club or organization: Not on file     Attends meetings of clubs or organizations: Not on file     Relationship status: Not on file    Intimate partner violence     Fear of current or ex partner: Not on file     Emotionally abused: Not on file     Physically abused: Not on file     Forced sexual activity: Not on file   Other Topics Concern    Not on file   Social History Narrative    Not on file           ROS:  [x] All negative/unchanged except if checked.  Explain positive(checked items) below:  [] Constitutional  [] Eyes  [] Ear/Nose/Mouth/Throat  [] Respiratory  [] CV  [] GI  []   [] Musculoskeletal  [] Skin/Breast  [] Neurological  [] Endocrine  [] Heme/Lymph  [] Allergic/Immunologic    Explanation:     MEDICATIONS:    Current Facility-Administered Medications:     OXcarbazepine (TRILEPTAL) tablet 150 mg, 150 mg, Oral, BID, Eugenio Ogden MD, 150 mg at 10/12/20 1336    chlordiazePOXIDE (LIBRIUM) capsule 10 mg, 10 mg, Oral, Q4H PRN **OR** chlordiazePOXIDE (LIBRIUM) capsule 25 mg, 25 mg, Oral, Q4H PRN **OR** chlordiazePOXIDE (LIBRIUM) capsule 50 mg, 50 mg, Oral, Q2H PRN, 50 mg at 10/11/20 1303 **OR** chlordiazePOXIDE (LIBRIUM) capsule 75 mg, 75 mg, Oral, Q1H PRN **OR** LORazepam (ATIVAN) tablet 4 mg, 4 mg, Oral, Q1H PRN, Adriana Leon MD    vitamin B-1 (THIAMINE) tablet 100 mg, 100 mg, Oral, Daily, Adriana Leon MD, 100 mg at 02/49/60 6110    folic acid (FOLVITE) tablet 1 mg, 1 mg, Oral, Daily, Adriana Leon MD, 1 mg at 10/12/20 1207    nicotine (NICODERM CQ) 14 MG/24HR 1 patch, 1 patch, Transdermal, Daily, Adriana Leon MD, 1 patch at 10/12/20 1207    traZODone (DESYREL) tablet 100 mg, 100 mg, Oral, Nightly PRN, Eugenio Ogden MD, 100 mg at 10/10/20 2227    acetaminophen (TYLENOL) tablet 650 mg, 650 mg, Oral, Q4H PRN, Eugenio Ogden MD, 650 mg at 10/08/20 2253    aluminum & magnesium hydroxide-simethicone (MAALOX) 200-200-20 MG/5ML suspension 30 mL, 30 mL, Oral, PRN, Salome Zhang Barbra Scott MD    haloperidol lactate (HALDOL) injection 5 mg, 5 mg, Intramuscular, Q4H PRN **OR** haloperidol (HALDOL) tablet 5 mg, 5 mg, Oral, Q4H PRN, Eugenio Ogden MD, 5 mg at 10/11/20 0525    hydrOXYzine (ATARAX) tablet 50 mg, 50 mg, Oral, TID PRN, Eugenio Ogden MD, 50 mg at 10/08/20 2052    magnesium hydroxide (MILK OF MAGNESIA) 400 MG/5ML suspension 30 mL, 30 mL, Oral, Daily PRN, Eugenio Ogden MD    polyethylene glycol (GLYCOLAX) packet 17 g, 17 g, Oral, Daily PRN, Sera Soliz MD    gabapentin (NEURONTIN) capsule 600 mg, 600 mg, Oral, TID, Sera Soliz MD, 600 mg at 10/12/20 1207    magnesium oxide (MAG-OX) tablet 400 mg, 400 mg, Oral, BID, Sera Soliz MD, 400 mg at 10/12/20 1207    multivitamin 1 tablet, 1 tablet, Oral, Daily, Eugenio Ogden MD, 1 tablet at 10/12/20 1207    influenza quadrivalent split vaccine (FLUZONE;FLUARIX;FLULAVAL;AFLURIA) injection 0.5 mL, 0.5 mL, Intramuscular, Once, Eugenio Ogden MD, Stopped at 10/11/20 1330      Examination:  BP 98/71   Pulse 81   Temp 98 °F (36.7 °C) (Oral)   Resp 18   SpO2 98%   Gait - steady  Medication side effects(SE): no    Mental Status Examination:    Level of consciousness:  within normal limits   Appearance:  fair grooming and fair hygiene  Behavior/Motor:  psychomotor retardation  Attitude toward examiner:  cooperative  Speech:  slow   Mood: decreased range and depressed  Affect:  blunted  Thought processes:  coherent   Thought content:  Suicidal Ideation: denies  Cognition:  oriented to person, place, and time   Concentration better  Insight better  Judgement better    ASSESSMENT:   Patient symptoms are:  [] Well controlled  [x] Improving  [] Worsening  [] No change      Diagnosis:   Active Problems:    Bipolar depression ([address]  Resolved Problems:    * No resolved hospital problems.  *  Alcohol and loperamide use disorder    LABS:    Recent Labs     10/11/20  1328   WBC 10.5   HGB 14.1        Recent Labs 10/11/20  1328 10/12/20  1424    138   K 4.1 4.0    104   CO2 20 24   BUN 20 17   CREATININE 0.89 0.78   GLUCOSE 117* 105*     Recent Labs     10/11/20  1328 10/12/20  1424   BILITOT 0.6 0.8*   ALKPHOS 66 67   AST 62* 53*   * 85*     Lab Results   Component Value Date    LABAMPH Neg 10/05/2020    BARBSCNU Neg 10/05/2020    LABBENZ Neg 10/05/2020    LABMETH Neg 10/05/2020    OPIATESCREENURINE Neg 10/05/2020    PHENCYCLIDINESCREENURINE Neg 10/05/2020    ETOH 81 10/05/2020     Lab Results   Component Value Date    TSH 1.890 10/05/2020     No results found for: LITHIUM  No results found for: VALPROATE, CBMZ      Treatment Plan:  Reviewed current Medications with the patient. Medication as ordered  D/C sitter- doing better  Risks, benefits, side effects, drug-to-drug interactions and alternatives to treatment were discussed. Collateral information:   CD evaluation  Encourage patient to attend group and other milieu activities.   Discharge planning discussed with the patient and treatment team.    PSYCHOTHERAPY/COUNSELING:  [x] Therapeutic interview  [x] Supportive  [] CBT  [] Ongoing  [] Other    [x] Patient continues to need, on a daily basis, active treatment furnished directly by or requiring the supervision of inpatient psychiatric personnel      Anticipated Length of stay:            Electronically signed by Ethan Morgan MD on 10/12/2020 at 4:19 PM

## 2020-10-12 NOTE — BH NOTE
Patient napped and woke up much more clear. He is asking questions about what had occurred recalling only moments of the last 2 days. He recalls hallucinating seeing bugs,insects and believing he was at different locations. He described what seemed like paranoid thoughts and vivid dreams. He expressed embarrassment about his behaviors. Education provided about addiction and affects from withdrawals. He denies current hallucinations and delusions. He expressed intent to call his Grandfather who he lives with and who he has not talk to for a few days. He denies any intent to harm himself or others. He spoke about desire to go home.

## 2020-10-12 NOTE — PROGRESS NOTES
Patient did not attend group despite staff encouragement.   Electronically signed by Izabella Mcneal on 10/12/2020 at 5:17 PM

## 2020-10-12 NOTE — PROGRESS NOTES
Pt resting in bed in seclusion room. Pt's eyes closed. Respirations even and unlabored. Pt does not appear restless. Pt remains on 1:1 observation for safety. Will continue to monitor.  Electronically signed by Hi Riley RN on 10/12/20 at 4:10 AM EDT

## 2020-10-12 NOTE — PROGRESS NOTES
Pt. declined to attend the 0900 community meeting, despite staff encouragement. Electronically signed by Rigoberto Lazar, [facility] on 10/12/2020 at 9:47 AM

## 2020-10-12 NOTE — PROGRESS NOTES
Patient remains on 1:1 observation. Pt has been resting in bed in seclusion room. Pt lying down in bed until recently. Pt now sitting in bed eating dinner and snack. Pt appears to have normal appetite. Pt drinking fluids. Pt being monitored on CIWA protocol. Pt not tremulous or sweaty. Pt slightly restless. Pt denies anxiety at this time. Pt does not appear to be hallucinating at this time. Pt denies pain or nausea. When asked where he is, pt states \"The psych floor. \" Pt does not know what hospital he is in. Pt states the date is \"October 13th. \" Oriented pt to date and place and pt responds \"GI Sami\" while smiling. Will continue to monitor pt.  Electronically signed by Sandy Elizabeth RN on 10/11/20 at 8:57 PM EDT

## 2020-10-12 NOTE — PROGRESS NOTES
Patient did not attend group despite staff encouragement.   Electronically signed by Rehana Hernandez on 10/11/2020 at 9:51 PM

## 2020-10-12 NOTE — CARE COORDINATION
FAMILY COLLATERAL NOTE    Family/Support Name: Tim Tomas  Contact #: [phone]  Relationship to Pt: grandfather      Placed call to above for collateral. No answer, left message requesting a return call.      Response:  LM  Electronically signed by Mei Savage, [facility] on 10/12/2020 at [address] [place] [address], [facility]

## 2020-10-12 NOTE — PROGRESS NOTES
Per Radha Cedeño to evaluate medications. Will administer medications after Dr. Antelmo Cedeño to evaluate medications.

## 2020-10-12 NOTE — ED NOTES
Pt in bed with eyes closed. Respirations are even and unlabored. No s/s of distress noted at this time. Will continue to monitor.

## 2020-10-12 NOTE — PROGRESS NOTES
Patient did not attend group despite staff encouragement.   Electronically signed by Gladys Chaves on 10/12/2020 at 7:41 PM

## 2020-10-12 NOTE — PROGRESS NOTES
Pt resting in bed in seclusion room. Pt's eyes closed. Respirations even and unlabored. Pt does not appear restless. Pt remains on 1:1 observation for safety. Will continue to monitor.  Electronically signed by Estefania Monroe RN on 10/11/20 at 10:11 PM EDT

## 2020-10-12 NOTE — PROGRESS NOTES
Pt is A & O X4., has been isolating to room but did eat dinner in day room. , did not attend groups this shift. Pt states his depression & anxiety are both #3/10. Pt denies SI/HI/AVH. Pt states he has not showered since he has been here but he agreed to shower this evening. States appetite is improving. Pt states he is sleepiing 11 hrs per day.

## 2020-10-12 NOTE — PROGRESS NOTES
Pt. refused to attend the 1000 skills group, despite staff encouragement. Electronically signed by Niharika Bishop, [address] on 10/12/2020 at 11:00 AM

## 2020-10-12 NOTE — PROGRESS NOTES
1857 Pt was still resting this morning but not sleep but he slept must of the night     0855 // Pt got breakfast tray he still in his PICC  I spoke then I ask him how he Doing he say fine then I ask how is he  Feeling are you feeling  Clear he say  Yes then he  Have his  orange juice lay back down Pt still remain 1:1 Doctor order . PT tray is still by his bedside  He just drinking for now .

## 2020-10-13 VITALS
SYSTOLIC BLOOD PRESSURE: 99 MMHG | HEART RATE: 86 BPM | OXYGEN SATURATION: 98 % | DIASTOLIC BLOOD PRESSURE: 65 MMHG | RESPIRATION RATE: 18 BRPM | TEMPERATURE: 98.6 F

## 2020-10-13 LAB
ALBUMIN SERPL-MCNC: 3.6 G/DL (ref 3.5–4.6)
ALP BLD-CCNC: 58 U/L (ref 35–104)
ALT SERPL-CCNC: 72 U/L (ref 0–41)
ANION GAP SERPL CALCULATED.3IONS-SCNC: 13 MEQ/L (ref 9–15)
AST SERPL-CCNC: 51 U/L (ref 0–40)
BILIRUB SERPL-MCNC: 0.5 MG/DL (ref 0.2–0.7)
BUN BLDV-MCNC: 16 MG/DL (ref 6–20)
CALCIUM SERPL-MCNC: 8.5 MG/DL (ref 8.5–9.9)
CHLORIDE BLD-SCNC: 98 MEQ/L (ref 95–107)
CO2: 17 MEQ/L (ref 20–31)
CREAT SERPL-MCNC: 0.8 MG/DL (ref 0.7–1.2)
GFR AFRICAN AMERICAN: >60
GFR NON-AFRICAN AMERICAN: >60
GLOBULIN: 2.9 G/DL (ref 2.3–3.5)
GLUCOSE BLD-MCNC: 102 MG/DL (ref 70–99)
POTASSIUM SERPL-SCNC: 5.1 MEQ/L (ref 3.4–4.9)
SODIUM BLD-SCNC: 128 MEQ/L (ref 135–144)
TOTAL PROTEIN: 6.5 G/DL (ref 6.3–8)

## 2020-10-13 PROCEDURE — 1240000000 HC EMOTIONAL WELLNESS R&B

## 2020-10-13 PROCEDURE — 36415 COLL VENOUS BLD VENIPUNCTURE: CPT

## 2020-10-13 PROCEDURE — 99232 SBSQ HOSP IP/OBS MODERATE 35: CPT | Performed by: PSYCHIATRY & NEUROLOGY

## 2020-10-13 PROCEDURE — 80053 COMPREHEN METABOLIC PANEL: CPT

## 2020-10-13 PROCEDURE — 90833 PSYTX W PT W E/M 30 MIN: CPT | Performed by: PSYCHIATRY & NEUROLOGY

## 2020-10-13 PROCEDURE — 93010 ELECTROCARDIOGRAM REPORT: CPT | Performed by: INTERNAL MEDICINE

## 2020-10-13 PROCEDURE — 6370000000 HC RX 637 (ALT 250 FOR IP): Performed by: PSYCHIATRY & NEUROLOGY

## 2020-10-13 PROCEDURE — 6370000000 HC RX 637 (ALT 250 FOR IP): Performed by: INTERNAL MEDICINE

## 2020-10-13 RX ADMIN — Medication 400 MG: at 21:03

## 2020-10-13 RX ADMIN — GABAPENTIN 600 MG: 300 CAPSULE ORAL at 13:44

## 2020-10-13 RX ADMIN — Medication 100 MG: at 10:06

## 2020-10-13 RX ADMIN — Medication 400 MG: at 10:06

## 2020-10-13 RX ADMIN — GABAPENTIN 600 MG: 300 CAPSULE ORAL at 10:07

## 2020-10-13 RX ADMIN — THERA TABS 1 TABLET: TAB at 10:06

## 2020-10-13 RX ADMIN — FOLIC ACID 1 MG: 1 TABLET ORAL at 10:06

## 2020-10-13 RX ADMIN — OXCARBAZEPINE 150 MG: 150 TABLET, FILM COATED ORAL at 10:06

## 2020-10-13 RX ADMIN — TRAZODONE HYDROCHLORIDE 100 MG: 100 TABLET ORAL at 21:03

## 2020-10-13 RX ADMIN — ACETAMINOPHEN 650 MG: 325 TABLET, FILM COATED ORAL at 14:27

## 2020-10-13 RX ADMIN — HYDROXYZINE HYDROCHLORIDE 50 MG: 25 TABLET, FILM COATED ORAL at 10:06

## 2020-10-13 RX ADMIN — GABAPENTIN 600 MG: 300 CAPSULE ORAL at 21:03

## 2020-10-13 RX ADMIN — OXCARBAZEPINE 150 MG: 150 TABLET, FILM COATED ORAL at 21:03

## 2020-10-13 ASSESSMENT — PAIN SCALES - GENERAL: PAINLEVEL_OUTOF10: 5

## 2020-10-13 NOTE — CARE COORDINATION
Patient did not attend group despite staff encouragement.  Electronically signed by KRYSTEN Hernandes on 10/13/2020 at 12:03 PM

## 2020-10-13 NOTE — BH NOTE
[facility] protocol d/c per protocol. Electronically signed by Elana Velasco RN on 10/13/2020 at 3:51 PM

## 2020-10-13 NOTE — GROUP NOTE
Group Therapy Note    Date: 10/13/2020    Group Start Time: 1435  Group End Time: 1500  Group Topic: Cognitive Skills    [facility]ARYLIN[facility]    Shanna Adams, [facility]        Group Therapy Note    Attendees: 10         Patient's Goal:  Learn thinking mistakes    Notes:  Patient participated    Status After Intervention:  Improved    Participation Level: Interactive    Participation Quality: Appropriate      Speech:  normal      Thought Process/Content: Logical      Affective Functioning: Congruent      Mood: anxious      Level of consciousness:  Alert      Response to Learning: Progressing to goal      Endings: None Reported    Modes of Intervention: Support      Discipline Responsible: /Counselor      Signature:  Flori Navarro, [facility]

## 2020-10-13 NOTE — PROGRESS NOTES
[facility]. FOLLOW-UP NOTE       10/13/2020     Patient was seen and examined in person, Chart reviewed   Patient's case discussed with staff/team    Chief Complaint: Depression SI    Interim History:   Pt continue to remain stable  Worried about his dog and fish  Clearing up since this morning  Less depressed or mood swings  Able to think clearly  Attending groups- thinking clearly    Appetite:   [x] Normal/Unchanged  [] Increased  [] Decreased      Sleep:       [] Normal/Unchanged  [x] Fair       [] Poor              Energy:    [] Normal/Unchanged  [] Increased  [x] Decreased        SI [] Present  [x] Absent    HI  []Present  [x] Absent     Aggression:  [] yes  [x] no    Patient is [x] able  [] unable to CONTRACT FOR SAFETY     PAST MEDICAL/PSYCHIATRIC HISTORY:   Past Medical History:   Diagnosis Date    Neuropathy        FAMILY/SOCIAL HISTORY:  History reviewed. No pertinent family history.   Social History     Socioeconomic History    Marital status: Single     Spouse name: Not on file    Number of children: Not on file    Years of education: Not on file    Highest education level: Not on file   Occupational History    Not on file   Social Needs    Financial resource strain: Not on file    Food insecurity     Worry: Not on file     Inability: Not on file    Transportation needs     Medical: Not on file     Non-medical: Not on file   Tobacco Use    Smoking status: Current Some Day Smoker     Types: Cigarettes    Smokeless tobacco: Never Used   Substance and Sexual Activity    Alcohol use: Yes     Comment: A 5th a day    Drug use: Not on file    Sexual activity: Not on file   Lifestyle    Physical activity     Days per week: Not on file     Minutes per session: Not on file    Stress: Not on file   Relationships    Social connections     Talks on phone: Not on file     Gets together: Not on file     Attends Baptism service: Not on file     Active member of club or organization: Not on file     Attends meetings of clubs or organizations: Not on file     Relationship status: Not on file    Intimate partner violence     Fear of current or ex partner: Not on file     Emotionally abused: Not on file     Physically abused: Not on file     Forced sexual activity: Not on file   Other Topics Concern    Not on file   Social History Narrative    Not on file           ROS:  [x] All negative/unchanged except if checked.  Explain positive(checked items) below:  [] Constitutional  [] Eyes  [] Ear/Nose/Mouth/Throat  [] Respiratory  [] CV  [] GI  []   [] Musculoskeletal  [] Skin/Breast  [] Neurological  [] Endocrine  [] Heme/Lymph  [] Allergic/Immunologic    Explanation:     MEDICATIONS:    Current Facility-Administered Medications:     OXcarbazepine (TRILEPTAL) tablet 150 mg, 150 mg, Oral, BID, Toña Mtz MD, 150 mg at 10/13/20 1006    chlordiazePOXIDE (LIBRIUM) capsule 10 mg, 10 mg, Oral, Q4H PRN **OR** chlordiazePOXIDE (LIBRIUM) capsule 25 mg, 25 mg, Oral, Q4H PRN **OR** chlordiazePOXIDE (LIBRIUM) capsule 50 mg, 50 mg, Oral, Q2H PRN, 50 mg at 10/11/20 1303 **OR** chlordiazePOXIDE (LIBRIUM) capsule 75 mg, 75 mg, Oral, Q1H PRN **OR** LORazepam (ATIVAN) tablet 4 mg, 4 mg, Oral, Q1H PRN, Vilma Teran MD    vitamin B-1 (THIAMINE) tablet 100 mg, 100 mg, Oral, Daily, Vilma Teran MD, 100 mg at 07/40/11 7121    folic acid (FOLVITE) tablet 1 mg, 1 mg, Oral, Daily, Vilma Teran MD, 1 mg at 10/13/20 1006    nicotine (NICODERM CQ) 14 MG/24HR 1 patch, 1 patch, Transdermal, Daily, Vilma Teran MD, 1 patch at 10/13/20 1005    traZODone (DESYREL) tablet 100 mg, 100 mg, Oral, Nightly PRN, Toña Mtz MD, 100 mg at 10/12/20 8089    acetaminophen (TYLENOL) tablet 650 mg, 650 mg, Oral, Q4H PRN, Toña Mtz MD, 650 mg at 10/08/20 2253    aluminum & magnesium hydroxide-simethicone (MAALOX) 185-261-86 MG/5ML suspension 30 mL, 30 mL, Oral, PRN, Toña Mtz, MD    haloperidol lactate (HALDOL) injection 5 mg, 5 mg, Intramuscular, Q4H PRN **OR** haloperidol (HALDOL) tablet 5 mg, 5 mg, Oral, Q4H PRN, Toña Mtz MD, 5 mg at 10/11/20 0525    hydrOXYzine (ATARAX) tablet 50 mg, 50 mg, Oral, TID PRN, Toña Mtz MD, 50 mg at 10/13/20 1006    magnesium hydroxide (MILK OF MAGNESIA) 400 MG/5ML suspension 30 mL, 30 mL, Oral, Daily PRN, Toña Mtz MD    polyethylene glycol (GLYCOLAX) packet 17 g, 17 g, Oral, Daily PRN, Pankaj Bailey MD    gabapentin (NEURONTIN) capsule 600 mg, 600 mg, Oral, TID, Pankaj Bailey MD, 600 mg at 10/13/20 1007    magnesium oxide (MAG-OX) tablet 400 mg, 400 mg, Oral, BID, Pankaj Bailey MD, 400 mg at 10/13/20 1006    multivitamin 1 tablet, 1 tablet, Oral, Daily, Toña Mtz MD, 1 tablet at 10/13/20 1006    influenza quadrivalent split vaccine (FLUZONE;FLUARIX;FLULAVAL;AFLURIA) injection 0.5 mL, 0.5 mL, Intramuscular, Once, Toña Mtz MD, Stopped at 10/11/20 1330      Examination:  BP 99/65   Pulse 86   Temp 98 °F (36.7 °C) (Oral)   Resp 18   SpO2 98%   Gait - steady  Medication side effects(SE): no    Mental Status Examination:    Level of consciousness:  within normal limits   Appearance:  fair grooming and fair hygiene  Behavior/Motor:  Less psychomotor retardation  Attitude toward examiner:  cooperative  Speech:  slow   Mood: less depressed  Affect:  blunted  Thought processes:  coherent   Thought content:  Suicidal Ideation: denies  Cognition:  oriented to person, place, and time   Concentration better  Insight better  Judgement better    ASSESSMENT:   Patient symptoms are:  [] Well controlled  [x] Improving  [] Worsening  [] No change      Diagnosis:   Active Problems:    Bipolar depression ([facility] [address]  Resolved Problems:    * No resolved hospital problems.  *  Alcohol and loperamide use disorder    LABS:    Recent Labs     10/11/20  1328   WBC 10.5   HGB 14.1        Recent Labs     10/11/20  1328

## 2020-10-13 NOTE — PROGRESS NOTES
Pt noted up on unit sitting in the dinning room , offered and gave vistaril for generalized anxiety # 5 related to worriing about his dog at home, pt denied all withdraw , stated that it's done now,.

## 2020-10-13 NOTE — PROGRESS NOTES
Patient did not attend group despite staff encouragement.   Electronically signed by Adina Blast on 10/12/2020 at 9:59 PM

## 2020-10-13 NOTE — PROGRESS NOTES
Pt requested tylenol for back pain #5, tylenol 2-325 tabs was given. Pt states that's what he takes the neurontin for . Reports that's the reason he took all the imodium was to get the effects of an opiate.

## 2020-10-13 NOTE — PROGRESS NOTES
Spiritual Support Group Note    Number of Participants in Group: 9                        Time: 13:30-14:20    Goal: Relief from isolation and loneliness             Sanjuanita Sharing             Self-understanding and gain insight              Acceptance and belonging            Recognize they are not alone                Socialization             Empowerment       Encouragement    Topic:  [] Spiritual Wellness and Self Care                  [x] Hope                     [] Connecting with Divine/Others        [] Thankfulness and Gratitude               [x]  Meaningfulness and Purpose               [] Forgiveness               [] Peace               [] Connect to [employer]      [] Other    Participation Level:   [x] Active Listener   [] Minimal   [] Monopolizing   [] Interactive   [] No Participation   []  Other:     Attention:   [x] Alert   [] Distractible   [] Drowsy   [] Poor   [] Other:    Manner:   [x] Cooperative   [] Suspicious   [] Withdrawn   [] Guarded   [] Irritable   [] Inhospitable   [] Other:     Others Comments from Group:

## 2020-10-13 NOTE — BH NOTE
Pt reports minimal anx/dep. Denies SI, HI, AVH. Patient requests PRN ativan for anxiety; reports minimal anxiety. Pt is anxious about something to do with his dog today. Patient calm, cooperative, flat affect. Breathing techniques recommended to patient, patient not interested.  Electronically signed by Chante Cabrera RN on 10/13/2020 at 9:21 AM

## 2020-10-13 NOTE — GROUP NOTE
Group Therapy Note    Date: 10/13/2020    Group Start Time: 1000  Group End Time: 1050  Group Topic: Psychoeducation    [facility]    Cindy Brooke        Group Therapy Note    Attendees: 8         Patient's Goal:  \"To go home\"    Notes:  Patient was quiet and kept to himself. He declined to work on a project but observed and stay in group. Status After Intervention:  Unchanged    Participation Level:  Active Listener    Participation Quality: Appropriate      Speech:  quiet      Thought Process/Content: Linear      Affective Functioning: Flat      Mood: calm      Level of consciousness:  Alert      Response to Learning: Progressing to goal      Endings: None Reported    Modes of Intervention: Education, Socialization and Activity      Discipline Responsible: Psychoeducational Specialist      Signature:  Linwood Hector

## 2020-10-13 NOTE — PROGRESS NOTES
Pt. attended the 0900 community meeting. Electronically signed by Piper Madrid, [address] on 10/13/2020 at 9:38 AM

## 2020-10-13 NOTE — PROGRESS NOTES
Patient did not attend group despite staff encouragement.   Electronically signed by Abiola Francis on 10/13/2020 at 7:00 PM

## 2020-10-13 NOTE — CARE COORDINATION
FAMILY COLLATERAL NOTE    Family/Support Name: Romell Ganser  Contact #:[phone]  Relationship to Pt[de-identified] grandfather - pt lives wih        Family/Support contact aware of hospitalization:  Presenting Symptoms/Current Concerns:  Patient went out and started drinking. Patient went grocery shopping for grandfather with $60 at 4pm and returned 4am the next day. Grandfather checked back account and patient has logged in 7x during the night and took $20-30 dollars at a time. When patient woke up grandfather questioned him and patient said he used grocery money to drink and needed more money to drink   Oletta Amsler was the medication that caused him all the side effects. Grandfather said patient is very intelligent and everything patient does he excels at. Top 3 Life Stressors: drinking, side effects of meds        Background History Relevant to Current Hospitalization:  Grandfather said the meds Caleen Cast gives him makes him sick. Patient throws up and does not eat and doctor will not prescribe the med that works for patient. Grandfather said he is very upset with the doctor. When patient is doing well he helps out a lot around the house. Grandfather is the only one in the family that will let him live there and he stated that he told patient the minute he uses drugs he is out the door. Grandfather stated alcohol he can deal with but not drugs. Grandfather has paid off all patients tickets so he can have his license back. Grandfather has a car patient can use once he has his license.        Family Mental Health/Substance Use History:   Patients father was a drug addict and OD  Patient paternal aunt was bipolar and stable on depakote      Support Network's Goal for Hospitalization: to be stable on the right med    Discharge Plan: to go back to grandfather home      Support Network Supportive of Discharge Plan: yes      Support can confirm Safety of Location and Security of Weapons: no weapons in the home      Support agreeable to Safeguard and Monitor Medications (including Prescription and OTC): patient takes his own medications    Identified Barriers to Compliance with Discharge Plan: relapse    Recommendations for Support Network:   Call mercy if any questions  Electronically signed by Gino Garces [facility] on 10/13/2020 at 11:19 AM      Gino Garces [facility]

## 2020-10-14 PROCEDURE — 99239 HOSP IP/OBS DSCHRG MGMT >30: CPT | Performed by: PSYCHIATRY & NEUROLOGY

## 2020-10-14 PROCEDURE — 6370000000 HC RX 637 (ALT 250 FOR IP): Performed by: PSYCHIATRY & NEUROLOGY

## 2020-10-14 PROCEDURE — 6370000000 HC RX 637 (ALT 250 FOR IP): Performed by: INTERNAL MEDICINE

## 2020-10-14 RX ORDER — TRAZODONE HYDROCHLORIDE 100 MG/1
100 TABLET ORAL NIGHTLY PRN
Qty: 15 TABLET | Refills: 2 | Status: ON HOLD | OUTPATIENT
Start: 2020-10-28 | End: 2022-01-20 | Stop reason: SDUPTHER

## 2020-10-14 RX ORDER — OXCARBAZEPINE 150 MG/1
150 TABLET, FILM COATED ORAL 2 TIMES DAILY
Qty: 30 TABLET | Refills: 3 | Status: SHIPPED | OUTPATIENT
Start: 2020-10-28 | End: 2022-01-13

## 2020-10-14 RX ADMIN — GABAPENTIN 600 MG: 300 CAPSULE ORAL at 12:13

## 2020-10-14 RX ADMIN — FOLIC ACID 1 MG: 1 TABLET ORAL at 09:12

## 2020-10-14 RX ADMIN — ACETAMINOPHEN 650 MG: 325 TABLET, FILM COATED ORAL at 09:12

## 2020-10-14 RX ADMIN — Medication 400 MG: at 09:12

## 2020-10-14 RX ADMIN — HYDROXYZINE HYDROCHLORIDE 50 MG: 25 TABLET, FILM COATED ORAL at 09:12

## 2020-10-14 RX ADMIN — GABAPENTIN 600 MG: 300 CAPSULE ORAL at 09:12

## 2020-10-14 RX ADMIN — OXCARBAZEPINE 150 MG: 150 TABLET, FILM COATED ORAL at 09:12

## 2020-10-14 RX ADMIN — THERA TABS 1 TABLET: TAB at 09:12

## 2020-10-14 RX ADMIN — Medication 100 MG: at 09:12

## 2020-10-14 ASSESSMENT — PAIN SCALES - GENERAL: PAINLEVEL_OUTOF10: 4

## 2020-10-14 NOTE — CARE COORDINATION
Patient did not attend group despite staff encouragement.  Electronically signed by KRYSTEN Donato on 10/14/2020 at 12:15 PM

## 2020-10-14 NOTE — GROUP NOTE
Group Therapy Note    Date: 10/13/2020    Group Start Time: 2050  Group End Time: 2110  Group Topic: Wrap-Up    [facility] 3W JULIANA Villegas        Group Therapy Note    Attendees: 7/19         Patient's Goal:  \"try to stay out of bed\"    Notes:  Patient reports only taking a small nap and attending most groups. Patient participated in deep breathing group following wrap up.     Status After Intervention:  Unchanged    Participation Level: Interactive    Participation Quality: Appropriate and Attentive      Speech:  normal      Thought Process/Content: Logical      Affective Functioning: Congruent      Mood: euthymic      Level of consciousness:  Alert and Attentive      Response to Learning: Progressing to goal      Endings: None Reported    Modes of Intervention: Support      Discipline Responsible: nPario      Signature:  Marah Villegas

## 2020-10-14 NOTE — PROGRESS NOTES
Explained and gave all am meds and Neurontin 300 mg  2-caps and also gave 2 pm Neurontin 300 mg 2 tabs  early 1212 due to discharge

## 2020-10-14 NOTE — DISCHARGE SUMMARY
no     Panic Attacks:  no     Phobias:  no     Obsessions and Compulsions:  no     PTSD : no     Hallucinations:  no     Delusions:  no     Past Psychiatric History:  Prior Diagnosis:  Bipolar and addiction  Psychiatrist: yes  Therapist:no  Hospitalization: yes  Hx of Suicidal Attempts: no  Hx of violence:  no  ECT: no        PAST MEDICAL/PSYCHIATRIC HISTORY:   Past Medical History:   Diagnosis Date    Neuropathy        FAMILY/SOCIAL HISTORY:  History reviewed. No pertinent family history.   Social History     Socioeconomic History    Marital status: Single     Spouse name: Not on file    Number of children: Not on file    Years of education: Not on file    Highest education level: Not on file   Occupational History    Not on file   Social Needs    Financial resource strain: Not on file    Food insecurity     Worry: Not on file     Inability: Not on file    Transportation needs     Medical: Not on file     Non-medical: Not on file   Tobacco Use    Smoking status: Current Some Day Smoker     Types: Cigarettes    Smokeless tobacco: Never Used   Substance and Sexual Activity    Alcohol use: Yes     Comment: A 5th a day    Drug use: Not on file    Sexual activity: Not on file   Lifestyle    Physical activity     Days per week: Not on file     Minutes per session: Not on file    Stress: Not on file   Relationships    Social connections     Talks on phone: Not on file     Gets together: Not on file     Attends Mormonism service: Not on file     Active member of club or organization: Not on file     Attends meetings of clubs or organizations: Not on file     Relationship status: Not on file    Intimate partner violence     Fear of current or ex partner: Not on file     Emotionally abused: Not on file     Physically abused: Not on file     Forced sexual activity: Not on file   Other Topics Concern    Not on file   Social History Narrative    Not on file       MEDICATIONS:    Current Facility-Administered Medications:     OXcarbazepine (TRILEPTAL) tablet 150 mg, 150 mg, Oral, BID, Mariaelena Lai MD, 150 mg at 10/14/20 0912    vitamin B-1 (THIAMINE) tablet 100 mg, 100 mg, Oral, Daily, Jose Montoya MD, 100 mg at 48/92/51 0099    folic acid (FOLVITE) tablet 1 mg, 1 mg, Oral, Daily, Jose Montoya MD, 1 mg at 10/14/20 0912    nicotine (Cari Grieves) 14 MG/24HR 1 patch, 1 patch, Transdermal, Daily, Jose Montoya MD, 1 patch at 10/14/20 0911    traZODone (DESYREL) tablet 100 mg, 100 mg, Oral, Nightly PRN, Mariaelena Lai MD, 100 mg at 10/13/20 2103    acetaminophen (TYLENOL) tablet 650 mg, 650 mg, Oral, Q4H PRN, Mariaelena Lai MD, 650 mg at 10/14/20 0912    aluminum & magnesium hydroxide-simethicone (MAALOX) 200-200-20 MG/5ML suspension 30 mL, 30 mL, Oral, PRN, Mariaelena Lai MD    haloperidol lactate (HALDOL) injection 5 mg, 5 mg, Intramuscular, Q4H PRN **OR** haloperidol (HALDOL) tablet 5 mg, 5 mg, Oral, Q4H PRN, Mariaelena Lai MD, 5 mg at 10/11/20 0525    hydrOXYzine (ATARAX) tablet 50 mg, 50 mg, Oral, TID PRN, Mariaelena Lai MD, 50 mg at 10/14/20 0912    magnesium hydroxide (MILK OF MAGNESIA) 400 MG/5ML suspension 30 mL, 30 mL, Oral, Daily PRN, Mariaelena Lai MD    polyethylene glycol (GLYCOLAX) packet 17 g, 17 g, Oral, Daily PRN, Shabnam Sultana MD    gabapentin (NEURONTIN) capsule 600 mg, 600 mg, Oral, TID, Shabnam Sultana MD, 600 mg at 10/14/20 0912    magnesium oxide (MAG-OX) tablet 400 mg, 400 mg, Oral, BID, Shabnam Sultana MD, 400 mg at 10/14/20 0912    multivitamin 1 tablet, 1 tablet, Oral, Daily, Mariaelena Lai MD, 1 tablet at 10/14/20 0912    influenza quadrivalent split vaccine (FLUZONE;FLUARIX;FLULAVAL;AFLURIA) injection 0.5 mL, 0.5 mL, Intramuscular, Once, Mariaelena Lai MD, Stopped at 10/11/20 1330    Examination:  BP 99/65   Pulse 86   Temp 98.6 °F (37 °C) (Oral)   Resp 18   SpO2 98%   Gait - steady    HOSPITAL COURSE[de-identified]  Following admission to the hospital, patient had a complete physical exam and blood work up  Patient was monitored closely with suicide precaution  Patient was started on medication as listed below  Was encouraged to participate in group and other milieu activity  Patient started to feel better with this combination of treatment. Significant progress in the symptoms since admission. Pt had brief period of severe confusion as part of w/d from loperamide  Settled down in 48 hours  Alert and oriented x 3    Mood better, with the score of 2/10 - bad  No AVH or paranoid thoughts  No Hopeless or worthless feeling  No active SI/HI  Appetite:  [x] Normal  [] Increased  [] Decreased    Sleep:       [x] Normal  [] Fair       [] Poor            Energy:    [x] Normal  [] Increased  [] Decreased     SI [] Present  [x] Absent  HI  []Present  [x] Absent   Aggression:  [] yes  [] no  Patient is [x] able  [] unable to CONTRACT FOR SAFETY   Medication side effects(SE):  [x] None(Psych. Meds.) [] Other      Mental Status Examination on discharge:    Level of consciousness:  within normal limits   Appearance:  well-appearing  Behavior/Motor:  no abnormalities noted  Attitude toward examiner:  attentive and good eye contact  Speech:  spontaneous, normal rate and normal volume   Mood: euthymic  Affect:  mood congruent  Thought processes:  goal directed   Thought content:  Suicidal Ideation:  denies suicidal ideation  Delusions:  no evidence of delusions  Perceptual Disturbance:  denies any perceptual disturbance  Cognition:  oriented to person, place, and time   Concentration intact  Memory intact  Insight good   Judgement fair   Fund of Knowledge adequate      ASSESSMENT:  Patient symptoms are:  [x] Well controlled  [x] Improving  [] Worsening  [] No change      Diagnosis:  Active Problems:    Bipolar depression ([facility] [address]  Resolved Problems:    * No resolved hospital problems.  *      LABS:    Recent Labs     10/11/20  1328   WBC 10.5   HGB 14.1        Recent Labs 10/11/20  1328 10/12/20  1424 10/13/20  0623    138 128*   K 4.1 4.0 5.1*    104 98   CO2 20 24 17*   BUN 20 17 16   CREATININE 0.89 0.78 0.80   GLUCOSE 117* 105* 102*     Recent Labs     10/11/20  1328 10/12/20  1424 10/13/20  0623   BILITOT 0.6 0.8* 0.5   ALKPHOS 66 67 58   AST 62* 53* 51*   * 85* 72*     Lab Results   Component Value Date    LABAMPH Neg 10/05/2020    BARBSCNU Neg 10/05/2020    LABBENZ Neg 10/05/2020    LABMETH Neg 10/05/2020    OPIATESCREENURINE Neg 10/05/2020    PHENCYCLIDINESCREENURINE Neg 10/05/2020    ETOH 81 10/05/2020     Lab Results   Component Value Date    TSH 1.890 10/05/2020     No results found for: LITHIUM  No results found for: VALPROATE, CBMZ    RISK ASSESSMENT AT DISCHARGE: Low risk for suicide and homicide. Treatment Plan:  Reviewed current Medications with the patient. Education provided on the complaince with treatment. Risks, benefits, side effects, drug-to-drug interactions and alternatives to treatment were discussed. Encourage patient to attend outpatient follow up appointment and therapy. Patient was advised to call the outpatient provider, visit the nearest ED or call 911 if symptoms are not manageable. Patient's family member was contacted prior to the discharge.          Medication List      START taking these medications    OXcarbazepine 150 MG tablet  Commonly known as:  TRILEPTAL  Take 1 tablet by mouth 2 times daily  Start taking on:  October 28, 2020     traZODone 100 MG tablet  Commonly known as:  DESYREL  Take 1 tablet by mouth nightly as needed for Sleep  Start taking on:  October 28, 2020        Roderick Dominguezer taking these medications    gabapentin 600 MG tablet  Commonly known as:  NEURONTIN        STOP taking these medications    prazosin 2 MG capsule  Commonly known as:  MINIPRESS           Where to Get Your Medications      These medications were sent to [facility] [address]", [address] [address] [place] [address]    Phone:  [phone]   · OXcarbazepine 150 MG tablet  · traZODone 100 MG tablet           Reason for more than one antipsychotic:   [x] N/A  [] 3 failed monotherapy(drugs tried):  [] Cross over to a new antipsychotic  [] Taper to monotherapy from polypharmacy  [] Augmentation of Clozapine therapy due to treatment resistance to single therapy        TIME SPEND - 35 MINUTES TO COMPLETE THE EVALUATION, DISCHARGE SUMMARY, MEDICATION RECONCILIATION AND FOLLOW UP CARE     Selina Hodge  10/14/2020  9:27 AM

## 2020-10-14 NOTE — SUICIDE SAFETY PLAN
SAFETY PLAN    A suicide Safety Plan is a document that supports someone when they are having thoughts of suicide. Warning Signs that indicate a suicidal crisis may be developing: What (situations, thoughts, feelings, body sensations, behaviors, etc.) do you experience that lets you know you are beginning to think about suicide? 1. isolate  2. Irrational crying  3. Lack of motivation    Internal Coping Strategies:  What things can I do (relaxation techniques, hobbies, physical activities, etc.) to take my mind off my problems without contacting another person?=  1. music  2. Fish tank  3. Playing with dog    People and social settings that provide distraction: Who can I call or where can I go to distract me? 1. Name: Will-  Phone: # in phone  2. Name: Mynor Gasca Phone: lives with  3. Place: giant eagle           4. Place: n/a    People whom I can ask for help: Who can I call when I need help - for example, friends, family, clergy, someone else? 1. Name: Will-               Phone: # in phone  2. Name: Grandfather phone: lives with  3. Name: mom  Phone: number in phone    Professionals or [address] [place] [address] Blvd I can contact during a crisis: Who can I call for help - for example, my doctor, my psychiatrist, my psychologist, a mental health provider, a suicide hotline? 1. Clinician Name: jamal [facility]-: number in phone      Clinician Pager or Emergency Contact #: - [phone]      3. Suicide Prevention Lifeline: 0-047-400-TALK (2415)    4. [address] [place] [address] Emergency Services -  for example, [facility] suicide hotline, [facility] Hotline: 431      Emergency Services Address: [address] [facility]      Emergency Services Phone: [phone]    Making the environment safe: How can I make my environment (house/apartment/living space) safer? For example, can I remove guns, medications, and other items? 1. Stay sober  2. Take meds/keep up with appointments.        Electronically signed by KRYSTEN Bang on 10/14/2020 at 11:51 AM

## 2020-10-14 NOTE — PROGRESS NOTES
CLINICAL PHARMACY NOTE: MEDS TO [address] [place] [address] Drive Select Patient?: No  Total # of Prescriptions Filled: 2   The following medications were delivered to the patient:  · Trazodone 100mg  · Oxcarbazepine 150mg  Total # of Interventions Completed: 0  Time Spent (min): 30    Additional Documentation:

## 2020-10-14 NOTE — CARE COORDINATION
Discharge instructions reviewed verbally and in writing including f/u appointments. Patient verbalizes understanding and signed as such. All belongings returned for discharge. Patient provided with food/drug interaction booklet. Patient denies SI, HI, A/V hallucinations, mood is stable.    Discharged with family for transport home

## 2020-10-14 NOTE — PROGRESS NOTES
Pt. attended the 0900 community meeting.  Electronically signed by Trey Ruffin on 10/14/2020 at 1:41 PM

## 2020-10-14 NOTE — GROUP NOTE
Group Therapy Note    Date: 10/14/2020    Group Start Time: 1000  Group End Time: 1100  Group Topic: Psychoeducation    [facility]    Lorena Manuel, CTRS        Group Therapy Note    Attendees: 8         Patient's Goal:  \"to go home\"    Notes:  Pt. attended the 1000 skill group. Talkative and engaged in the group discussion. Feels better and is happy to be going home. Status After Intervention:  Improved    Participation Level:  Active Listener and Interactive    Participation Quality: Appropriate, Attentive and Sharing      Speech:  normal      Thought Process/Content: Logical      Affective Functioning: Congruent      Mood: calm      Level of consciousness:  Alert, Oriented x4 and Attentive      Response to Learning: Progressing to goal      Endings: None Reported    Modes of Intervention: Education, Support, Socialization and Activity      Discipline Responsible: Psychoeducational Specialist      Signature:  Apple Johnson

## 2021-10-29 ENCOUNTER — APPOINTMENT (OUTPATIENT)
Dept: GENERAL RADIOLOGY | Age: 36
End: 2021-10-29
Payer: COMMERCIAL

## 2021-10-29 ENCOUNTER — HOSPITAL ENCOUNTER (EMERGENCY)
Age: 36
Discharge: HOME | End: 2021-10-30
Payer: COMMERCIAL

## 2021-10-29 DIAGNOSIS — M62.82 NON-TRAUMATIC RHABDOMYOLYSIS: ICD-10-CM

## 2021-10-29 DIAGNOSIS — Z86.59 HISTORY OF BIPOLAR DISORDER: ICD-10-CM

## 2021-10-29 DIAGNOSIS — F19.10 POLYSUBSTANCE ABUSE (HCC): Primary | ICD-10-CM

## 2021-10-29 PROCEDURE — 80179 DRUG ASSAY SALICYLATE: CPT

## 2021-10-29 PROCEDURE — 80143 DRUG ASSAY ACETAMINOPHEN: CPT

## 2021-10-29 PROCEDURE — 81001 URINALYSIS AUTO W/SCOPE: CPT

## 2021-10-29 PROCEDURE — 82553 CREATINE MB FRACTION: CPT

## 2021-10-29 PROCEDURE — 80053 COMPREHEN METABOLIC PANEL: CPT

## 2021-10-29 PROCEDURE — 85025 COMPLETE CBC W/AUTO DIFF WBC: CPT

## 2021-10-29 PROCEDURE — 80307 DRUG TEST PRSMV CHEM ANLYZR: CPT

## 2021-10-29 PROCEDURE — 36415 COLL VENOUS BLD VENIPUNCTURE: CPT

## 2021-10-29 PROCEDURE — 83605 ASSAY OF LACTIC ACID: CPT

## 2021-10-29 PROCEDURE — 82550 ASSAY OF CK (CPK): CPT

## 2021-10-29 PROCEDURE — 80061 LIPID PANEL: CPT

## 2021-10-29 PROCEDURE — 82140 ASSAY OF AMMONIA: CPT

## 2021-10-29 PROCEDURE — 99284 EMERGENCY DEPT VISIT MOD MDM: CPT

## 2021-10-29 PROCEDURE — 84443 ASSAY THYROID STIM HORMONE: CPT

## 2021-10-29 PROCEDURE — 71045 X-RAY EXAM CHEST 1 VIEW: CPT

## 2021-10-30 VITALS
OXYGEN SATURATION: 98 % | HEIGHT: 71 IN | WEIGHT: 162 LBS | TEMPERATURE: 98.3 F | SYSTOLIC BLOOD PRESSURE: 117 MMHG | DIASTOLIC BLOOD PRESSURE: 88 MMHG | HEART RATE: 80 BPM | BODY MASS INDEX: 22.68 KG/M2 | RESPIRATION RATE: 22 BRPM

## 2021-10-30 LAB
ACETAMINOPHEN LEVEL: <5 UG/ML (ref 10–30)
ALBUMIN SERPL-MCNC: 5.2 G/DL (ref 3.5–4.6)
ALP BLD-CCNC: 64 U/L (ref 35–104)
ALT SERPL-CCNC: 125 U/L (ref 0–41)
AMMONIA: 35 UMOL/L (ref 16–60)
AMPHETAMINE SCREEN, URINE: POSITIVE
ANION GAP SERPL CALCULATED.3IONS-SCNC: 12 MEQ/L (ref 9–15)
AST SERPL-CCNC: 171 U/L (ref 0–40)
BACTERIA: NEGATIVE /HPF
BARBITURATE SCREEN URINE: ABNORMAL
BASOPHILS ABSOLUTE: 0 K/UL (ref 0–0.2)
BASOPHILS RELATIVE PERCENT: 0.2 %
BENZODIAZEPINE SCREEN, URINE: ABNORMAL
BILIRUB SERPL-MCNC: 0.7 MG/DL (ref 0.2–0.7)
BILIRUBIN URINE: NEGATIVE
BLOOD, URINE: ABNORMAL
BUN BLDV-MCNC: 11 MG/DL (ref 6–20)
CALCIUM SERPL-MCNC: 10.3 MG/DL (ref 8.5–9.9)
CANNABINOID SCREEN URINE: POSITIVE
CHLORIDE BLD-SCNC: 101 MEQ/L (ref 95–107)
CHOLESTEROL, TOTAL: 139 MG/DL (ref 0–199)
CK MB: 11.7 NG/ML (ref 0–6.7)
CK MB: 13.9 NG/ML (ref 0–6.7)
CK MB: 15.4 NG/ML (ref 0–6.7)
CLARITY: CLEAR
CO2: 27 MEQ/L (ref 20–31)
COCAINE METABOLITE SCREEN URINE: POSITIVE
COLOR: ABNORMAL
CREAT SERPL-MCNC: 0.71 MG/DL (ref 0.7–1.2)
CREATINE KINASE-MB INDEX: 1.1 % (ref 0–3.5)
EOSINOPHILS ABSOLUTE: 0 K/UL (ref 0–0.7)
EOSINOPHILS RELATIVE PERCENT: 0 %
EPITHELIAL CELLS, UA: ABNORMAL /HPF (ref 0–5)
ETHANOL PERCENT: NORMAL G/DL
ETHANOL: <10 MG/DL (ref 0–0.08)
GFR AFRICAN AMERICAN: >60
GFR NON-AFRICAN AMERICAN: >60
GLOBULIN: 3.2 G/DL (ref 2.3–3.5)
GLUCOSE BLD-MCNC: 122 MG/DL (ref 70–99)
GLUCOSE URINE: NEGATIVE MG/DL
HCT VFR BLD CALC: 44.2 % (ref 42–52)
HDLC SERPL-MCNC: 68 MG/DL (ref 40–59)
HEMOGLOBIN: 15.2 G/DL (ref 14–18)
HYALINE CASTS: ABNORMAL /HPF (ref 0–5)
KETONES, URINE: 15 MG/DL
LACTIC ACID: 1.1 MMOL/L (ref 0.5–2.2)
LDL CHOLESTEROL CALCULATED: 62 MG/DL (ref 0–129)
LEUKOCYTE ESTERASE, URINE: NEGATIVE
LYMPHOCYTES ABSOLUTE: 0.7 K/UL (ref 1–4.8)
LYMPHOCYTES RELATIVE PERCENT: 12.1 %
Lab: ABNORMAL
MCH RBC QN AUTO: 33.2 PG (ref 27–31.3)
MCHC RBC AUTO-ENTMCNC: 34.4 % (ref 33–37)
MCV RBC AUTO: 96.7 FL (ref 80–100)
METHADONE SCREEN, URINE: ABNORMAL
MONOCYTES ABSOLUTE: 0.3 K/UL (ref 0.2–0.8)
MONOCYTES RELATIVE PERCENT: 5.7 %
NEUTROPHILS ABSOLUTE: 5 K/UL (ref 1.4–6.5)
NEUTROPHILS RELATIVE PERCENT: 82 %
NITRITE, URINE: NEGATIVE
OPIATE SCREEN URINE: ABNORMAL
OXYCODONE URINE: ABNORMAL
PDW BLD-RTO: 13.9 % (ref 11.5–14.5)
PH UA: 6.5 (ref 5–9)
PHENCYCLIDINE SCREEN URINE: ABNORMAL
PLATELET # BLD: 227 K/UL (ref 130–400)
POTASSIUM SERPL-SCNC: 5.1 MEQ/L (ref 3.4–4.9)
POTASSIUM SERPL-SCNC: 5.3 MEQ/L (ref 3.4–4.9)
PROPOXYPHENE SCREEN: ABNORMAL
PROTEIN UA: 100 MG/DL
RBC # BLD: 4.58 M/UL (ref 4.7–6.1)
RBC UA: ABNORMAL /HPF (ref 0–5)
SALICYLATE, SERUM: <0.3 MG/DL (ref 15–30)
SARS-COV-2, NAAT: NOT DETECTED
SODIUM BLD-SCNC: 140 MEQ/L (ref 135–144)
SPECIFIC GRAVITY UA: 1.02 (ref 1–1.03)
TOTAL CK: 1096 U/L (ref 0–190)
TOTAL CK: 1286 U/L (ref 0–190)
TOTAL CK: 1404 U/L (ref 0–190)
TOTAL PROTEIN: 8.4 G/DL (ref 6.3–8)
TRIGL SERPL-MCNC: 45 MG/DL (ref 0–150)
TSH SERPL DL<=0.05 MIU/L-ACNC: 0.76 UIU/ML (ref 0.44–3.86)
URINE REFLEX TO CULTURE: ABNORMAL
UROBILINOGEN, URINE: 1 E.U./DL
WBC # BLD: 6.1 K/UL (ref 4.8–10.8)
WBC UA: ABNORMAL /HPF (ref 0–5)

## 2021-10-30 PROCEDURE — 82553 CREATINE MB FRACTION: CPT

## 2021-10-30 PROCEDURE — 2580000003 HC RX 258: Performed by: STUDENT IN AN ORGANIZED HEALTH CARE EDUCATION/TRAINING PROGRAM

## 2021-10-30 PROCEDURE — 36415 COLL VENOUS BLD VENIPUNCTURE: CPT

## 2021-10-30 PROCEDURE — 82550 ASSAY OF CK (CPK): CPT

## 2021-10-30 PROCEDURE — 93005 ELECTROCARDIOGRAM TRACING: CPT | Performed by: EMERGENCY MEDICINE

## 2021-10-30 PROCEDURE — 84132 ASSAY OF SERUM POTASSIUM: CPT

## 2021-10-30 PROCEDURE — 87635 SARS-COV-2 COVID-19 AMP PRB: CPT

## 2021-10-30 PROCEDURE — 82077 ASSAY SPEC XCP UR&BREATH IA: CPT

## 2021-10-30 RX ORDER — 0.9 % SODIUM CHLORIDE 0.9 %
1000 INTRAVENOUS SOLUTION INTRAVENOUS ONCE
Status: COMPLETED | OUTPATIENT
Start: 2021-10-30 | End: 2021-10-30

## 2021-10-30 RX ADMIN — SODIUM CHLORIDE 1000 ML: 9 INJECTION, SOLUTION INTRAVENOUS at 05:59

## 2021-10-30 RX ADMIN — SODIUM CHLORIDE 1000 ML: 9 INJECTION, SOLUTION INTRAVENOUS at 01:48

## 2021-10-30 RX ADMIN — SODIUM CHLORIDE 1000 ML: 9 INJECTION, SOLUTION INTRAVENOUS at 05:56

## 2021-10-30 NOTE — ED NOTES
Assumed care of patient  Patient resting with eyes closed  No acute distress noted      Mallika Vicente RN  10/30/21 5249

## 2021-10-30 NOTE — ED NOTES
Spoke with Adan from the [facility], patient was being treated for detox at the [facility]   It is reported that they are unsure of what the patient is detox ing from. Patient presentation at the [facility] was diaphoresis, body shacks, pacing, and stating that people were going to hurt him with guns. Per the [facility] patient has a Hx of cocaine. THC and ETOH use, they are unsure what the patient is withdrawing from at this time and would like medical clearance and labs for the patient.      Beverley Holly, [place]  10/30/21 7234

## 2021-10-30 NOTE — ED NOTES
Patient is resting with eyes closed no S/S of distress noted at this time.      [facility]  10/30/21 1250

## 2021-10-30 NOTE — ED PROVIDER NOTES
[address] ED  EMERGENCY DEPARTMENT ENCOUNTER      Pt Name: Amy Urbano  MRN: [id number]  Armstrongfrosales 1985  Date of evaluation: 10/29/2021  Provider: Batsheva Pederson PA-C    CHIEF COMPLAINT       Chief Complaint   Patient presents with    Mental Health Problem         HISTORY OF PRESENT ILLNESS   (Location/Symptom, Timing/Onset, Context/Setting, Quality, Duration, Modifying Factors, Severity)  Note limiting factors. Amy Urbano is a 39 y.o. male who per chart review has pmhx of bipolar disorder etoh abuse polysubstance presents to the emergency department for psychiatric evaluation. Pt is staying at the [facility] for detox currently. detoxing from multiple different drugs including meth and cocaine as well as alcohol. [facility] reports pt has been sober for 4 days. Pt noted to be hallucinating, agitated with uncontrollable movements so EMS was called. EMS gave pt haldol ativan and benadryl and had pt restrained. Pt sedated on arrival to the ED therefore hx limited. Later while in the ED, pt admits to taking a \"stimulant\" tonight prior to sx onset. HPI    Nursing Notes were reviewed. REVIEW OF SYSTEMS    (2-9 systems for level 4, 10 or more for level 5)     Review of Systems   Unable to perform ROS: Psychiatric disorder       Except as noted above the remainder of the review of systems was reviewed and negative. PAST MEDICAL HISTORY     Past Medical History:   Diagnosis Date    Neuropathy          SURGICAL HISTORY     No past surgical history on file. CURRENT MEDICATIONS       Previous Medications    GABAPENTIN (NEURONTIN) 600 MG TABLET    Take 600 mg by mouth 3 times daily. OXCARBAZEPINE (TRILEPTAL) 150 MG TABLET    Take 1 tablet by mouth 2 times daily    TRAZODONE (DESYREL) 100 MG TABLET    Take 1 tablet by mouth nightly as needed for Sleep       ALLERGIES     Quetiapine and Penicillins    FAMILY HISTORY     No family history on file.        SOCIAL HISTORY Social History     Socioeconomic History    Marital status: Single     Spouse name: Not on file    Number of children: Not on file    Years of education: Not on file    Highest education level: Not on file   Occupational History    Not on file   Tobacco Use    Smoking status: Current Some Day Smoker     Types: Cigarettes    Smokeless tobacco: Never Used   Vaping Use    Vaping Use: Former   Substance and Sexual Activity    Alcohol use: Yes     Comment: A 5th a day    Drug use: Not on file    Sexual activity: Not on file   Other Topics Concern    Not on file   Social History Narrative    Not on file     Social Determinants of Health     Financial Resource Strain:     Difficulty of Paying Living Expenses:    Food Insecurity:     Worried About [address] in the Last Year:     [address] [facility] in the Last Year:    Transportation Needs:     Lack of Transportation (Medical):  Lack of Transportation (Non-Medical):    Physical Activity:     Days of Exercise per Week:     Minutes of Exercise per Session:    Stress:     Feeling of Stress :    Social Connections:     Frequency of Communication with Friends and Family:     Frequency of Social Gatherings with Friends and Family:     Attends [employer] Services:     Active Member of Clubs or Organizations:     Attends Club or Organization Meetings:     Marital Status:    Intimate Partner Violence:     Fear of Current or Ex-Partner:     Emotionally Abused:     Physically Abused:     Sexually Abused:        SCREENINGS                        PHYSICAL EXAM    (up to 7 for level 4, 8 or more for level 5)     ED Triage Vitals   BP Temp Temp Source Pulse Resp SpO2 Height Weight   10/29/21 2225 10/30/21 0044 10/30/21 0044 10/29/21 2225 10/29/21 2225 10/29/21 2225 -- --   105/77 98.3 °F (36.8 °C) Oral 97 16 94 %         Physical Exam  Constitutional:       General: He is not in acute distress. Appearance: He is well-developed.  He is diaphoretic. He is not ill-appearing or toxic-appearing. HENT:      Head: Normocephalic and atraumatic. Nose: Nose normal.      Mouth/Throat:      Mouth: Mucous membranes are moist.   Eyes:      Pupils: Pupils are equal, round, and reactive to light. Cardiovascular:      Rate and Rhythm: Normal rate and regular rhythm. Pulses: Normal pulses. Heart sounds: No murmur heard. No friction rub. No gallop. Pulmonary:      Effort: Pulmonary effort is normal.      Breath sounds: Normal breath sounds. No wheezing, rhonchi or rales. Abdominal:      General: Bowel sounds are normal. There is no distension. Palpations: Abdomen is soft. Tenderness: There is no abdominal tenderness. There is no guarding or rebound. Musculoskeletal:         General: No swelling. Cervical back: Normal range of motion. Skin:     General: Skin is warm. Capillary Refill: Capillary refill takes less than 2 seconds. Neurological:      Mental Status: He is unresponsive. DIAGNOSTIC RESULTS     EKG: All EKG's are interpreted by the Emergency Department Physician who either signs or Co-signs this chart in the absence of a cardiologist.    EKG shows NSR with HR 87, L axis, normal intervals, no ST changes. RADIOLOGY:   Non-plain film images such as CT, Ultrasound and MRI are read by the radiologist. Plain radiographic images are visualized and preliminarily interpreted by the emergency physician with the below findings:      Interpretation per the Radiologist below, if available at the time of this note:    XR CHEST PORTABLE   Final Result      No radiographic evidence of acute intrathoracic process.                   ED BEDSIDE ULTRASOUND:   Performed by ED Physician - none    LABS:  Labs Reviewed   ACETAMINOPHEN LEVEL - Abnormal; Notable for the following components:       Result Value    Acetaminophen Level <5 (*)     All other components within normal limits   CBC WITH AUTO DIFFERENTIAL - All other components within normal limits   CK - Abnormal; Notable for the following components: Total CK 1,096 (*)     All other components within normal limits   COVID-19, RAPID   ETHANOL   TSH WITHOUT REFLEX   AMMONIA   LACTIC ACID, PLASMA   CKMB & RELATIVE PERCENT       All other labs were within normal range or not returned as of this dictation. EMERGENCY DEPARTMENT COURSE and DIFFERENTIAL DIAGNOSIS/MDM:   Vitals:    Vitals:    10/30/21 0410 10/30/21 0559 10/30/21 0707 10/30/21 0845   BP: 104/71 123/67 104/80 110/68   Pulse: 75 71 78 79   Resp:  16     Temp:       TempSrc:       SpO2:  99%  100%   Weight:       Height:         MDM  Number of Diagnoses or Management Options  History of bipolar disorder  Non-traumatic rhabdomyolysis  Polysubstance abuse ([address]  Diagnosis management comments: Patient states he is currently active with the [facility] for substance abuse, he states he does methamphetamines, cocaine, and is addicted to Suboxone, as well as alcohol. Patient initially had some tremors on arrival to the hospital, not consistent with that of alcohol withdrawal. He has been comfortable during the majority of his visit, his CK level was elevated due to substance abuse issues, he was given IV hydration, patient will be discharged home, he is advised to contact the [facility] upon returning home for continued care for ongoing substance use disorder. Patient was advised if he has any worsening or change symptoms, he was advised to return to the ER. Pt is a 38 yo M who presents to the ED for psychiatric evaluation. Pt sedated in route to the ED therefore unable to initially provide history. He is diaphoretic on arrival with stable vitals. He was given 2 L IV NS in the ED. CK 1404. K 5.3. remainder of labs unremarkable. CXR NAD. EKG shows NSR with HR 87, L axis, normal intervals, no ST changes. Will recheck CK and K after fluids. Pt later easily aroused but lethargic. He is A&O x 4 when woken up.

## 2021-10-30 NOTE — ED NOTES
In with patient re drawing CK level  Lab obtained and sent  Pt restrained bilaterally soft wrist restraints    Pt is acting calm and cooperative  States \"I dont feel like im going to freak out\"  Restraints discontinued       Donte Royal RN  10/30/21 6721

## 2021-10-30 NOTE — ED NOTES
[facility] [employer] [facility]  10/30/21 3236

## 2021-11-01 LAB
EKG ATRIAL RATE: 87 BPM
EKG P AXIS: 60 DEGREES
EKG P-R INTERVAL: 134 MS
EKG Q-T INTERVAL: 394 MS
EKG QRS DURATION: 88 MS
EKG QTC CALCULATION (BAZETT): 474 MS
EKG R AXIS: -56 DEGREES
EKG T AXIS: 41 DEGREES
EKG VENTRICULAR RATE: 87 BPM

## 2021-11-01 PROCEDURE — 93010 ELECTROCARDIOGRAM REPORT: CPT | Performed by: INTERNAL MEDICINE

## 2021-11-13 ENCOUNTER — HOSPITAL ENCOUNTER (EMERGENCY)
Age: 36
Discharge: PSYCHIATRIC OR SUBSTANCE USE FACILITY | End: 2021-11-14
Attending: EMERGENCY MEDICINE
Payer: COMMERCIAL

## 2021-11-13 DIAGNOSIS — R45.851 SUICIDAL IDEATIONS: Primary | ICD-10-CM

## 2021-11-13 DIAGNOSIS — F48.9 MENTAL HEALTH PROBLEM: ICD-10-CM

## 2021-11-13 LAB
ACETAMINOPHEN LEVEL: <5 MCG/ML (ref 10–30)
AMPHETAMINE SCREEN, URINE: NOT DETECTED
ANION GAP SERPL CALCULATED.3IONS-SCNC: 12 MMOL/L (ref 7–16)
BACTERIA: ABNORMAL /HPF
BARBITURATE SCREEN URINE: POSITIVE
BASOPHILS ABSOLUTE: 0.03 E9/L (ref 0–0.2)
BASOPHILS RELATIVE PERCENT: 0.3 % (ref 0–2)
BENZODIAZEPINE SCREEN, URINE: NOT DETECTED
BILIRUBIN URINE: NEGATIVE
BLOOD, URINE: NEGATIVE
BUN BLDV-MCNC: 8 MG/DL (ref 6–20)
CALCIUM SERPL-MCNC: 8.9 MG/DL (ref 8.6–10.2)
CANNABINOID SCREEN URINE: NOT DETECTED
CHLORIDE BLD-SCNC: 105 MMOL/L (ref 98–107)
CLARITY: CLEAR
CO2: 23 MMOL/L (ref 22–29)
COCAINE METABOLITE SCREEN URINE: NOT DETECTED
COLOR: YELLOW
CREAT SERPL-MCNC: 0.9 MG/DL (ref 0.7–1.2)
EOSINOPHILS ABSOLUTE: 0.01 E9/L (ref 0.05–0.5)
EOSINOPHILS RELATIVE PERCENT: 0.1 % (ref 0–6)
ETHANOL: <10 MG/DL (ref 0–0.08)
FENTANYL SCREEN, URINE: NOT DETECTED
GFR AFRICAN AMERICAN: >60
GFR NON-AFRICAN AMERICAN: >60 ML/MIN/1.73
GLUCOSE BLD-MCNC: 101 MG/DL (ref 74–99)
GLUCOSE URINE: NEGATIVE MG/DL
HCT VFR BLD CALC: 41 % (ref 37–54)
HEMOGLOBIN: 13.6 G/DL (ref 12.5–16.5)
IMMATURE GRANULOCYTES #: 0.04 E9/L
IMMATURE GRANULOCYTES %: 0.4 % (ref 0–5)
INFLUENZA A: NOT DETECTED
INFLUENZA B: NOT DETECTED
KETONES, URINE: NEGATIVE MG/DL
LEUKOCYTE ESTERASE, URINE: NEGATIVE
LYMPHOCYTES ABSOLUTE: 2.48 E9/L (ref 1.5–4)
LYMPHOCYTES RELATIVE PERCENT: 26.8 % (ref 20–42)
Lab: ABNORMAL
MCH RBC QN AUTO: 32.3 PG (ref 26–35)
MCHC RBC AUTO-ENTMCNC: 33.2 % (ref 32–34.5)
MCV RBC AUTO: 97.4 FL (ref 80–99.9)
METHADONE SCREEN, URINE: NOT DETECTED
MONOCYTES ABSOLUTE: 0.56 E9/L (ref 0.1–0.95)
MONOCYTES RELATIVE PERCENT: 6 % (ref 2–12)
NEUTROPHILS ABSOLUTE: 6.14 E9/L (ref 1.8–7.3)
NEUTROPHILS RELATIVE PERCENT: 66.4 % (ref 43–80)
NITRITE, URINE: NEGATIVE
OPIATE SCREEN URINE: NOT DETECTED
OXYCODONE URINE: NOT DETECTED
PDW BLD-RTO: 12.3 FL (ref 11.5–15)
PH UA: 7.5 (ref 5–9)
PHENCYCLIDINE SCREEN URINE: NOT DETECTED
PLATELET # BLD: 397 E9/L (ref 130–450)
PMV BLD AUTO: 9.8 FL (ref 7–12)
POTASSIUM SERPL-SCNC: 4.6 MMOL/L (ref 3.5–5)
PROTEIN UA: NEGATIVE MG/DL
RBC # BLD: 4.21 E12/L (ref 3.8–5.8)
RBC UA: ABNORMAL /HPF (ref 0–2)
SALICYLATE, SERUM: <0.3 MG/DL (ref 0–30)
SARS-COV-2 RNA, RT PCR: NOT DETECTED
SODIUM BLD-SCNC: 140 MMOL/L (ref 132–146)
SPECIFIC GRAVITY UA: 1.01 (ref 1–1.03)
TRICYCLIC ANTIDEPRESSANTS SCREEN SERUM: NEGATIVE NG/ML
UROBILINOGEN, URINE: 0.2 E.U./DL
WBC # BLD: 9.3 E9/L (ref 4.5–11.5)
WBC UA: ABNORMAL /HPF (ref 0–5)

## 2021-11-13 PROCEDURE — 80143 DRUG ASSAY ACETAMINOPHEN: CPT

## 2021-11-13 PROCEDURE — 80307 DRUG TEST PRSMV CHEM ANLYZR: CPT

## 2021-11-13 PROCEDURE — 82077 ASSAY SPEC XCP UR&BREATH IA: CPT

## 2021-11-13 PROCEDURE — 6360000002 HC RX W HCPCS: Performed by: EMERGENCY MEDICINE

## 2021-11-13 PROCEDURE — 87636 SARSCOV2 & INF A&B AMP PRB: CPT

## 2021-11-13 PROCEDURE — 85025 COMPLETE CBC W/AUTO DIFF WBC: CPT

## 2021-11-13 PROCEDURE — 81001 URINALYSIS AUTO W/SCOPE: CPT

## 2021-11-13 PROCEDURE — 99285 EMERGENCY DEPT VISIT HI MDM: CPT

## 2021-11-13 PROCEDURE — 80179 DRUG ASSAY SALICYLATE: CPT

## 2021-11-13 PROCEDURE — 96372 THER/PROPH/DIAG INJ SC/IM: CPT

## 2021-11-13 PROCEDURE — 6370000000 HC RX 637 (ALT 250 FOR IP): Performed by: EMERGENCY MEDICINE

## 2021-11-13 PROCEDURE — 80048 BASIC METABOLIC PNL TOTAL CA: CPT

## 2021-11-13 RX ORDER — HALOPERIDOL 5 MG/ML
5 INJECTION INTRAMUSCULAR ONCE
Status: COMPLETED | OUTPATIENT
Start: 2021-11-13 | End: 2021-11-13

## 2021-11-13 RX ORDER — LORAZEPAM 1 MG/1
2 TABLET ORAL ONCE
Status: COMPLETED | OUTPATIENT
Start: 2021-11-13 | End: 2021-11-13

## 2021-11-13 RX ADMIN — HALOPERIDOL LACTATE 5 MG: 5 INJECTION, SOLUTION INTRAMUSCULAR at 13:44

## 2021-11-13 RX ADMIN — LORAZEPAM 2 MG: 1 TABLET ORAL at 11:40

## 2021-11-13 RX ADMIN — HALOPERIDOL LACTATE 5 MG: 5 INJECTION, SOLUTION INTRAMUSCULAR at 23:49

## 2021-11-13 ASSESSMENT — ENCOUNTER SYMPTOMS
CHEST TIGHTNESS: 0
ABDOMINAL PAIN: 0
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
## 2021-11-13 NOTE — ED NOTES
RONEY GONZALEZ reached out to Narayan Keith at [phone] and spoke to Kathy Ashley and completed a referral for to find inpatient admission.      RUTH Hendricks, [place]  11/13/21 0536
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
## 2021-11-13 NOTE — ED NOTES
Spoke with RONEY nurse John about adding urinalysis for [employer] referral, requested medical clearance note from Dr Tavia Karimi. When these complete pt can be referred to [employer] [facility] for possible in-patient admit to [facility] which pt is requesting.        [address], [facility], [place]  11/13/21 5960

## 2021-11-14 VITALS
OXYGEN SATURATION: 99 % | TEMPERATURE: 98 F | RESPIRATION RATE: 18 BRPM | SYSTOLIC BLOOD PRESSURE: 105 MMHG | DIASTOLIC BLOOD PRESSURE: 78 MMHG | HEART RATE: 74 BPM

## 2021-11-14 PROCEDURE — 6370000000 HC RX 637 (ALT 250 FOR IP): Performed by: EMERGENCY MEDICINE

## 2021-11-14 PROCEDURE — 93005 ELECTROCARDIOGRAM TRACING: CPT | Performed by: EMERGENCY MEDICINE

## 2021-11-14 PROCEDURE — 6360000002 HC RX W HCPCS: Performed by: EMERGENCY MEDICINE

## 2021-11-14 RX ORDER — LORAZEPAM 1 MG/1
2 TABLET ORAL ONCE
Status: COMPLETED | OUTPATIENT
Start: 2021-11-14 | End: 2021-11-14

## 2021-11-14 RX ORDER — HALOPERIDOL 5 MG/ML
5 INJECTION INTRAMUSCULAR EVERY 6 HOURS PRN
Status: DISCONTINUED | OUTPATIENT
Start: 2021-11-14 | End: 2021-11-14 | Stop reason: HOSPADM

## 2021-11-14 RX ADMIN — LORAZEPAM 2 MG: 1 TABLET ORAL at 10:33

## 2021-11-14 RX ADMIN — HALOPERIDOL LACTATE 5 MG: 5 INJECTION, SOLUTION INTRAMUSCULAR at 07:47

## 2021-11-14 NOTE — ED NOTES
[facility] is requesting pink slip, covid paper screening, ekg be faxed to them at [phone].      KayRUTH Morin, [place]  11/14/21 7605

## 2021-11-14 NOTE — ED NOTES
PT HAS BEEN ACCEPTED TO [address] [place] [address] BY DR. Talia Briones DUAL UNIT    PHYSICIANS WILL TRANSPORT BY 0915    [address] [facility]  11/14/21 3503

## 2021-11-14 NOTE — ED NOTES
Pt resting with eyes closed awakens easily alert oriented skin warm dry resp easy pt admitts to a/v hallucinations denies suicidal ideations pt has fair eye contact flat affect is cooperative and in control pt requesting alana German RN  11/13/21 2937
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
## 2021-11-14 NOTE — ED NOTES
Faxed over requested Pink Slip to [facility] EMERGENC[facility]ICAL [place].        RUTH Bobby, [place]  11/13/21 0847

## 2021-11-15 LAB
EKG ATRIAL RATE: 73 BPM
EKG P AXIS: 60 DEGREES
EKG P-R INTERVAL: 130 MS
EKG Q-T INTERVAL: 402 MS
EKG QRS DURATION: 84 MS
EKG QTC CALCULATION (BAZETT): 442 MS
EKG R AXIS: -46 DEGREES
EKG T AXIS: 33 DEGREES
EKG VENTRICULAR RATE: 73 BPM

## 2021-11-15 PROCEDURE — 93010 ELECTROCARDIOGRAM REPORT: CPT | Performed by: INTERNAL MEDICINE

## 2022-01-13 ENCOUNTER — HOSPITAL ENCOUNTER (INPATIENT)
Age: 37
LOS: 6 days | Discharge: HOME | DRG: 753 | End: 2022-01-20
Attending: PSYCHIATRY & NEUROLOGY | Admitting: PSYCHIATRY & NEUROLOGY
Payer: COMMERCIAL

## 2022-01-13 DIAGNOSIS — F19.10 POLYSUBSTANCE ABUSE (HCC): ICD-10-CM

## 2022-01-13 DIAGNOSIS — G62.9 NEUROPATHY: ICD-10-CM

## 2022-01-13 DIAGNOSIS — F31.9 BIPOLAR 1 DISORDER (HCC): Primary | ICD-10-CM

## 2022-01-13 LAB
ACETAMINOPHEN LEVEL: <5 UG/ML (ref 10–30)
ALBUMIN SERPL-MCNC: 4.5 G/DL (ref 3.5–4.6)
ALP BLD-CCNC: 89 U/L (ref 35–104)
ALT SERPL-CCNC: 133 U/L (ref 0–41)
AMPHETAMINE SCREEN, URINE: NORMAL
ANION GAP SERPL CALCULATED.3IONS-SCNC: 16 MEQ/L (ref 9–15)
AST SERPL-CCNC: 178 U/L (ref 0–40)
BARBITURATE SCREEN URINE: NORMAL
BASOPHILS ABSOLUTE: 0 K/UL (ref 0–0.2)
BASOPHILS RELATIVE PERCENT: 0.1 %
BENZODIAZEPINE SCREEN, URINE: NORMAL
BILIRUB SERPL-MCNC: <0.2 MG/DL (ref 0.2–0.7)
BILIRUBIN URINE: NEGATIVE
BLOOD, URINE: NEGATIVE
BUN BLDV-MCNC: 13 MG/DL (ref 6–20)
CALCIUM SERPL-MCNC: 9.3 MG/DL (ref 8.5–9.9)
CANNABINOID SCREEN URINE: NORMAL
CHLORIDE BLD-SCNC: 91 MEQ/L (ref 95–107)
CHOLESTEROL, TOTAL: 195 MG/DL (ref 0–199)
CK MB: 21.9 NG/ML (ref 0–6.7)
CK MB: 24.6 NG/ML (ref 0–6.7)
CLARITY: CLEAR
CO2: 21 MEQ/L (ref 20–31)
COCAINE METABOLITE SCREEN URINE: NORMAL
COLOR: YELLOW
CREAT SERPL-MCNC: 0.71 MG/DL (ref 0.7–1.2)
CREATINE KINASE-MB INDEX: 2.2 % (ref 0–3.5)
CREATINE KINASE-MB INDEX: 2.5 % (ref 0–3.5)
EOSINOPHILS ABSOLUTE: 0 K/UL (ref 0–0.7)
EOSINOPHILS RELATIVE PERCENT: 0.1 %
ETHANOL PERCENT: 0.03 G/DL
ETHANOL PERCENT: 0.17 G/DL
ETHANOL: 193 MG/DL (ref 0–0.08)
ETHANOL: 32 MG/DL (ref 0–0.08)
GFR AFRICAN AMERICAN: >60
GFR NON-AFRICAN AMERICAN: >60
GLOBULIN: 4.3 G/DL (ref 2.3–3.5)
GLUCOSE BLD-MCNC: 129 MG/DL (ref 70–99)
GLUCOSE URINE: NEGATIVE MG/DL
HCT VFR BLD CALC: 42 % (ref 42–52)
HDLC SERPL-MCNC: 49 MG/DL (ref 40–59)
HEMOGLOBIN: 13.9 G/DL (ref 14–18)
KETONES, URINE: NEGATIVE MG/DL
LDL CHOLESTEROL CALCULATED: 114 MG/DL (ref 0–129)
LEUKOCYTE ESTERASE, URINE: NEGATIVE
LYMPHOCYTES ABSOLUTE: 2.1 K/UL (ref 1–4.8)
LYMPHOCYTES RELATIVE PERCENT: 24.7 %
Lab: NORMAL
MCH RBC QN AUTO: 30.4 PG (ref 27–31.3)
MCHC RBC AUTO-ENTMCNC: 33 % (ref 33–37)
MCV RBC AUTO: 92.2 FL (ref 80–100)
METHADONE SCREEN, URINE: NORMAL
MONOCYTES ABSOLUTE: 0.5 K/UL (ref 0.2–0.8)
MONOCYTES RELATIVE PERCENT: 5.4 %
NEUTROPHILS ABSOLUTE: 6 K/UL (ref 1.4–6.5)
NEUTROPHILS RELATIVE PERCENT: 69.7 %
NITRITE, URINE: NEGATIVE
OPIATE SCREEN URINE: NORMAL
OXYCODONE URINE: NORMAL
PDW BLD-RTO: 13 % (ref 11.5–14.5)
PH UA: 6 (ref 5–9)
PHENCYCLIDINE SCREEN URINE: NORMAL
PLATELET # BLD: 345 K/UL (ref 130–400)
POTASSIUM SERPL-SCNC: 3.7 MEQ/L (ref 3.4–4.9)
PROPOXYPHENE SCREEN: NORMAL
PROTEIN UA: NEGATIVE MG/DL
RBC # BLD: 4.55 M/UL (ref 4.7–6.1)
SALICYLATE, SERUM: <0.3 MG/DL (ref 15–30)
SARS-COV-2, NAAT: NOT DETECTED
SODIUM BLD-SCNC: 128 MEQ/L (ref 135–144)
SODIUM BLD-SCNC: 137 MEQ/L (ref 135–144)
SPECIFIC GRAVITY UA: 1.02 (ref 1–1.03)
TOTAL CK: 992 U/L (ref 0–190)
TOTAL CK: 994 U/L (ref 0–190)
TOTAL PROTEIN: 8.8 G/DL (ref 6.3–8)
TRIGL SERPL-MCNC: 161 MG/DL (ref 0–150)
TSH SERPL DL<=0.05 MIU/L-ACNC: 2.33 UIU/ML (ref 0.44–3.86)
URINE REFLEX TO CULTURE: NORMAL
UROBILINOGEN, URINE: 0.2 E.U./DL
WBC # BLD: 8.6 K/UL (ref 4.8–10.8)

## 2022-01-13 PROCEDURE — 80179 DRUG ASSAY SALICYLATE: CPT

## 2022-01-13 PROCEDURE — 93005 ELECTROCARDIOGRAM TRACING: CPT | Performed by: PSYCHIATRY & NEUROLOGY

## 2022-01-13 PROCEDURE — 80061 LIPID PANEL: CPT

## 2022-01-13 PROCEDURE — 81003 URINALYSIS AUTO W/O SCOPE: CPT

## 2022-01-13 PROCEDURE — 84443 ASSAY THYROID STIM HORMONE: CPT

## 2022-01-13 PROCEDURE — 80053 COMPREHEN METABOLIC PANEL: CPT

## 2022-01-13 PROCEDURE — 80143 DRUG ASSAY ACETAMINOPHEN: CPT

## 2022-01-13 PROCEDURE — 85025 COMPLETE CBC W/AUTO DIFF WBC: CPT

## 2022-01-13 PROCEDURE — 82550 ASSAY OF CK (CPK): CPT

## 2022-01-13 PROCEDURE — 87635 SARS-COV-2 COVID-19 AMP PRB: CPT

## 2022-01-13 PROCEDURE — 82077 ASSAY SPEC XCP UR&BREATH IA: CPT

## 2022-01-13 PROCEDURE — 36415 COLL VENOUS BLD VENIPUNCTURE: CPT

## 2022-01-13 PROCEDURE — 99285 EMERGENCY DEPT VISIT HI MDM: CPT

## 2022-01-13 PROCEDURE — 82553 CREATINE MB FRACTION: CPT

## 2022-01-13 PROCEDURE — 6370000000 HC RX 637 (ALT 250 FOR IP): Performed by: PHYSICIAN ASSISTANT

## 2022-01-13 PROCEDURE — 84295 ASSAY OF SERUM SODIUM: CPT

## 2022-01-13 PROCEDURE — 80307 DRUG TEST PRSMV CHEM ANLYZR: CPT

## 2022-01-13 RX ORDER — BUPRENORPHINE HYDROCHLORIDE AND NALOXONE HYDROCHLORIDE DIHYDRATE 8; 2 MG/1; MG/1
1 TABLET SUBLINGUAL 2 TIMES DAILY
COMMUNITY
Start: 2021-10-27

## 2022-01-13 RX ORDER — CHLORDIAZEPOXIDE HYDROCHLORIDE 25 MG/1
50 CAPSULE, GELATIN COATED ORAL ONCE
Status: COMPLETED | OUTPATIENT
Start: 2022-01-13 | End: 2022-01-13

## 2022-01-13 RX ORDER — HALOPERIDOL 10 MG/1
10 TABLET ORAL ONCE
Status: COMPLETED | OUTPATIENT
Start: 2022-01-13 | End: 2022-01-13

## 2022-01-13 RX ORDER — ALBUTEROL SULFATE 90 UG/1
2 AEROSOL, METERED RESPIRATORY (INHALATION) EVERY 4 HOURS PRN
COMMUNITY
Start: 2021-12-17

## 2022-01-13 RX ORDER — LORAZEPAM 1 MG/1
1 TABLET ORAL ONCE
Status: COMPLETED | OUTPATIENT
Start: 2022-01-13 | End: 2022-01-13

## 2022-01-13 RX ORDER — CLONIDINE HYDROCHLORIDE 0.1 MG/1
0.1 TABLET ORAL 2 TIMES DAILY PRN
COMMUNITY
Start: 2021-11-12

## 2022-01-13 RX ORDER — HYDROXYZINE PAMOATE 50 MG/1
50 CAPSULE ORAL EVERY 6 HOURS PRN
COMMUNITY
Start: 2021-11-29

## 2022-01-13 RX ADMIN — CHLORDIAZEPOXIDE HYDROCHLORIDE 50 MG: 25 CAPSULE ORAL at 22:25

## 2022-01-13 RX ADMIN — HALOPERIDOL 10 MG: 10 TABLET ORAL at 16:33

## 2022-01-13 RX ADMIN — LORAZEPAM 1 MG: 1 TABLET ORAL at 18:46

## 2022-01-13 RX ADMIN — LORAZEPAM 1 MG: 1 TABLET ORAL at 14:46

## 2022-01-13 ASSESSMENT — ENCOUNTER SYMPTOMS
ABDOMINAL PAIN: 0
COLOR CHANGE: 0
ALLERGIC/IMMUNOLOGIC NEGATIVE: 1
APNEA: 0
TROUBLE SWALLOWING: 0
EYE PAIN: 0
SHORTNESS OF BREATH: 0

## 2022-01-13 ASSESSMENT — PATIENT HEALTH QUESTIONNAIRE - PHQ9: SUM OF ALL RESPONSES TO PHQ QUESTIONS 1-9: 23

## 2022-01-13 NOTE — ED NOTES
Patient complaints of increased auditory hallucinations with intrusive derogatory comments. Patient requesting medications to help with his symptoms. Call placed to provider.       Gulshan Leblanc RN  01/13/22 6106

## 2022-01-13 NOTE — ED NOTES
Patient admitted to [facility] OF [facility] bed 1. Oriented to unit and changed into psychiatric safe clothing. Belongings secured and [employer].       Wyatt Rodriguez RN  01/13/22 3651

## 2022-01-13 NOTE — ED PROVIDER NOTES
[place] [facility]  EMERGENCY DEPARTMENT ENCOUNTER      Pt Name: Bryan Wilks  MRN: [id number]  [place] 1985  Date of evaluation: 1/13/2022  Provider: Mariann Beebe PA-C    CHIEF COMPLAINT       Chief Complaint   Patient presents with    Psychiatric Evaluation     pt was sent to the ER by Cecelia Santana for thoughts of harming himself, Pt denies any intention of acting on those thoughts and help with alcohol addiction         HISTORY OF PRESENT ILLNESS   (Location/Symptom, Timing/Onset, Context/Setting, Quality, Duration, Modifying Factors, Severity)  Note limiting factors. Bryan Wilks is a 39 y.o. male who presents to the emergency department with complaints of depression with suicidal ideation with multiple possible plans but ongoing for the past month. Patient also states daily alcohol consumption for the past year as a way to self medicate for his depression. Patient does have a history of suicide attempts in the past.  Patient denies any homicidal ideation. Patient denies any recent illness or injury    HPI    Nursing Notes were reviewed. REVIEW OF SYSTEMS    (2-9 systems for level 4, 10 or more for level 5)     Review of Systems   Constitutional: Negative for diaphoresis and fever. HENT: Negative for hearing loss and trouble swallowing. Eyes: Negative for pain. Respiratory: Negative for apnea and shortness of breath. Cardiovascular: Negative for chest pain. Gastrointestinal: Negative for abdominal pain. Endocrine: Negative. Genitourinary: Negative for hematuria. Musculoskeletal: Negative for neck pain and neck stiffness. Skin: Negative for color change. Allergic/Immunologic: Negative. Neurological: Negative for dizziness and numbness. Hematological: Negative. Psychiatric/Behavioral: Positive for suicidal ideas. The patient is nervous/anxious. All other systems reviewed and are negative. Except as noted above the remainder of the review of systems was reviewed and negative. PAST MEDICAL HISTORY     Past Medical History:   Diagnosis Date    Depression     Neuropathy          SURGICAL HISTORY       Past Surgical History:   Procedure Laterality Date    EYE SURGERY Left 2021    Ruptured globe         CURRENT MEDICATIONS       Current Discharge Medication List      CONTINUE these medications which have NOT CHANGED    Details   albuterol sulfate  (90 Base) MCG/ACT inhaler Inhale 2 puffs into the lungs every 4 hours as needed      buprenorphine-naloxone (SUBOXONE) 8-2 MG SUBL SL tablet Place 1 tablet under the tongue 2 times daily. cloNIDine (CATAPRES) 0.1 MG tablet Take 0.1 mg by mouth 2 times daily as needed      hydrOXYzine (VISTARIL) 50 MG capsule Take 50 mg by mouth every 6 hours as needed      traZODone (DESYREL) 100 MG tablet Take 1 tablet by mouth nightly as needed for Sleep  Qty: 15 tablet, Refills: 2      gabapentin (NEURONTIN) 600 MG tablet Take 400 mg by mouth 3 times daily. Verified by [facility]             ALLERGIES     Penicillins and Quetiapine    FAMILY HISTORY     History reviewed. No pertinent family history.        SOCIAL HISTORY       Social History     Socioeconomic History    Marital status: Single     Spouse name: None    Number of children: None    Years of education: None    Highest education level: None   Occupational History    None   Tobacco Use    Smoking status: Current Some Day Smoker     Packs/day: 0.50     Years: 18.00     Pack years: 9.00     Types: Cigarettes    Smokeless tobacco: Never Used   Vaping Use    Vaping Use: Former   Substance and Sexual Activity    Alcohol use: Yes     Comment: A 5th a day for the past year    Drug use: Not Currently     Comment: sober from cocaine for 5 weeks    Sexual activity: Not Currently   Other Topics Concern    None   Social History Narrative    None     Social Determinants of Health     Financial Resource Strain:     Difficulty of Paying Living Expenses: Not on file   Food Insecurity:     Worried About [facility] in the Last Year: Not on file    [place] of Food in the Last Year: Not on file   Transportation Needs:     Lack of Transportation (Medical): Not on file    Lack of Transportation (Non-Medical): Not on file   Physical Activity:     Days of Exercise per Week: Not on file    Minutes of Exercise per Session: Not on file   Stress:     Feeling of Stress : Not on file   Social Connections:     Frequency of Communication with Friends and Family: Not on file    Frequency of Social Gatherings with Friends and Family: Not on file    Attends Restoration Services: Not on file    Active Member of [address] [place] [address] or Organizations: Not on file    Attends Club or Organization Meetings: Not on file    Marital Status: Not on file   Intimate Partner Violence:     Fear of Current or Ex-Partner: Not on file    Emotionally Abused: Not on file    Physically Abused: Not on file    Sexually Abused: Not on file   Housing Stability:     Unable to Pay for Housing in the Last Year: Not on file    Number of [place] in the Last Year: Not on file    Unstable Housing in the Last Year: Not on file       SCREENINGS        Tono Coma Scale  Eye Opening: Spontaneous  Best Verbal Response: Oriented  Best Motor Response: Obeys commands  Tono Coma Scale Score: 15               PHYSICAL EXAM    (up to 7 for level 4, 8 or more for level 5)     ED Triage Vitals [01/13/22 1345]   BP Temp Temp Source Pulse Resp SpO2 Height Weight   127/86 98.6 °F (37 °C) Oral 104 16 96 % 5' 11\" (1.803 m) 176 lb (79.8 kg)       Physical Exam  Vitals and nursing note reviewed. Constitutional:       General: He is not in acute distress. Appearance: He is well-developed. He is not diaphoretic. HENT:      Head: Normocephalic and atraumatic. Mouth/Throat:      Pharynx: No oropharyngeal exudate. Eyes:      General: No scleral icterus. Left eye: Discharge present. Neck:      Trachea: No tracheal deviation. Cardiovascular:      Rate and Rhythm: Normal rate. Heart sounds: Normal heart sounds. Pulmonary:      Effort: Pulmonary effort is normal. No respiratory distress. Breath sounds: Normal breath sounds. Abdominal:      General: Bowel sounds are normal. There is no distension. Palpations: Abdomen is soft. Musculoskeletal:         General: Normal range of motion. Cervical back: Normal range of motion and neck supple. Skin:     General: Skin is warm and dry. Findings: No erythema or rash. Neurological:      Mental Status: He is alert and oriented to person, place, and time. Cranial Nerves: No cranial nerve deficit. Motor: No abnormal muscle tone. Psychiatric:         Mood and Affect: Mood is depressed. Affect is tearful. Behavior: Behavior normal.         Thought Content: Thought content includes suicidal ideation. Thought content includes suicidal plan. Judgment: Judgment is impulsive.          DIAGNOSTIC RESULTS     EKG: All EKG's are interpreted by the Emergency Department Physician who either signs or Co-signs this chart in the absence of a cardiologist.    Normal sinus rhythm, rate 72 bpm, left axis deviation, prolonged QT with a QTC of 490    RADIOLOGY:   Non-plain film images such as CT, Ultrasound and MRI are read by the radiologist. Plain radiographic images are visualized and preliminarily interpreted by the emergency physician with the below findings:        Interpretation per the Radiologist below, if available at the time of this note:    No orders to display         ED BEDSIDE ULTRASOUND:   Performed by ED Physician - none    LABS:  Labs Reviewed   ACETAMINOPHEN LEVEL - Abnormal; Notable for the following components:       Result Value    Acetaminophen Level <5 (*)     All other components within normal limits   CBC WITH AUTO DIFFERENTIAL - Abnormal; Notable for the following components:    RBC 4.55 (*)     Hemoglobin 13.9 (*)     All other components within normal limits   CK - Abnormal; Notable for the following components: Total  (*)     All other components within normal limits   COMPREHENSIVE METABOLIC PANEL - Abnormal; Notable for the following components:    Sodium 128 (*)     Chloride 91 (*)     Anion Gap 16 (*)     Glucose 129 (*)     Total Protein 8.8 (*)      (*)      (*)     Globulin 4.3 (*)     All other components within normal limits   LIPID PANEL - Abnormal; Notable for the following components:    Triglycerides 161 (*)     All other components within normal limits   SALICYLATE LEVEL - Abnormal; Notable for the following components:    Salicylate, Serum <4.7 (*)     All other components within normal limits   CKMB & RELATIVE PERCENT - Abnormal; Notable for the following components:    CK-MB 24.6 (*)     All other components within normal limits   CK - Abnormal; Notable for the following components: Total  (*)     All other components within normal limits   CKMB & RELATIVE PERCENT - Abnormal; Notable for the following components:    CK-MB 21.9 (*)     All other components within normal limits   CK - Abnormal; Notable for the following components: Total  (*)     All other components within normal limits   CKMB & RELATIVE PERCENT - Abnormal; Notable for the following components:    CK-MB 18.4 (*)     All other components within normal limits   COVID-19, RAPID   ETHANOL   TSH WITHOUT REFLEX   URINE DRUG SCREEN   URINE RT REFLEX TO CULTURE   ETHANOL   SODIUM   HIV SCREEN   CARDIOLIPIN ANTIBODIES IGG & IGM       All other labs were within normal range or not returned as of this dictation.     EMERGENCY DEPARTMENT COURSE and DIFFERENTIAL DIAGNOSIS/MDM:   Vitals:    Vitals:    01/17/22 0816 01/17/22 1638 01/18/22 0835 01/18/22 0836   BP: 108/70 104/86 108/76 116/82   Pulse: 105 91 93 103   Resp: 20  18    Temp: 98.1 °F (36.7 °C) 98.4 °F (36.9 °C) 98.2 °F (36.8 °C)    TempSrc: Oral  Oral    SpO2: 98% 98% 97%    Weight:       Height:           Patient is medically cleared for psychiatric evaluation and care    MDM      REASSESSMENT              CONSULTS:  IP CONSULT TO HOSPITALIST  IP CONSULT TO SOCIAL WORK    PROCEDURES:  Unless otherwise noted below, none     Procedures        FINAL IMPRESSION      1. [facility] 1 [facility] ([facility]    2. Polysubstance abuse ([facility].)          DISPOSITION/PLAN   DISPOSITION Admitted 01/14/2022 10:12:15 AM      PATIENT REFERRED TO:  No follow-up provider specified. DISCHARGE MEDICATIONS:  Current Discharge Medication List        Controlled Substances Monitoring:     No flowsheet data found.     (Please note that portions of this note were completed with a voice recognition program.  Efforts were made to edit the dictations but occasionally words are mis-transcribed.)    Ramon Campbell PA-C (electronically signed)  Attending Emergency Physician            Ramon Campbell PA-C  01/18/22 2920

## 2022-01-13 NOTE — ED NOTES
Received call from Jimmy at [facility]. [facility] patient was originally sent to the ED for medical clearance for their HAYDEN unit for ETOH withdrawal. During their assessment, patient complained of auditory hallucinations along with ETOH withdrawal symptoms. Linette Abraham called to request patient's BAL upon arrival to the ED. [facility] patient will not be accepted on their HAYDEN unit due to acuity.       Jhonny Sheikh RN  01/13/22 4748
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
## 2022-01-13 NOTE — ED NOTES
[facility] ran by provider and confirmed patient is current with Suboxone treatment and last filled on 1/5/22 for a 14 day supply.       Adrian Li RN  01/13/22 0796

## 2022-01-13 NOTE — ED NOTES
Lab notified of new lab orders, spoke with Shelle Oppenheim.       Gulshan Leblanc RN  01/13/22 1548

## 2022-01-14 PROBLEM — F31.9 BIPOLAR 1 DISORDER (HCC): Status: ACTIVE | Noted: 2022-01-14

## 2022-01-14 LAB
CK MB: 18.4 NG/ML (ref 0–6.7)
CREATINE KINASE-MB INDEX: 2 % (ref 0–3.5)
EKG ATRIAL RATE: 72 BPM
EKG P AXIS: 54 DEGREES
EKG P-R INTERVAL: 150 MS
EKG Q-T INTERVAL: 448 MS
EKG QRS DURATION: 90 MS
EKG QTC CALCULATION (BAZETT): 490 MS
EKG R AXIS: -30 DEGREES
EKG T AXIS: -32 DEGREES
EKG VENTRICULAR RATE: 72 BPM
TOTAL CK: 909 U/L (ref 0–190)

## 2022-01-14 PROCEDURE — 6370000000 HC RX 637 (ALT 250 FOR IP): Performed by: PSYCHIATRY & NEUROLOGY

## 2022-01-14 PROCEDURE — 36415 COLL VENOUS BLD VENIPUNCTURE: CPT

## 2022-01-14 PROCEDURE — 6370000000 HC RX 637 (ALT 250 FOR IP)

## 2022-01-14 PROCEDURE — 82550 ASSAY OF CK (CPK): CPT

## 2022-01-14 PROCEDURE — 6360000002 HC RX W HCPCS: Performed by: PSYCHIATRY & NEUROLOGY

## 2022-01-14 PROCEDURE — 6370000000 HC RX 637 (ALT 250 FOR IP): Performed by: PHYSICIAN ASSISTANT

## 2022-01-14 PROCEDURE — 94664 DEMO&/EVAL PT USE INHALER: CPT

## 2022-01-14 PROCEDURE — 82553 CREATINE MB FRACTION: CPT

## 2022-01-14 PROCEDURE — 99223 1ST HOSP IP/OBS HIGH 75: CPT | Performed by: PSYCHIATRY & NEUROLOGY

## 2022-01-14 PROCEDURE — 1240000000 HC EMOTIONAL WELLNESS R&B

## 2022-01-14 RX ORDER — CHLORDIAZEPOXIDE HYDROCHLORIDE 25 MG/1
75 CAPSULE, GELATIN COATED ORAL
Status: DISCONTINUED | OUTPATIENT
Start: 2022-01-14 | End: 2022-01-18

## 2022-01-14 RX ORDER — ALBUTEROL SULFATE 90 UG/1
2 AEROSOL, METERED RESPIRATORY (INHALATION) EVERY 4 HOURS PRN
Status: DISCONTINUED | OUTPATIENT
Start: 2022-01-14 | End: 2022-01-20 | Stop reason: HOSPADM

## 2022-01-14 RX ORDER — CHLORDIAZEPOXIDE HYDROCHLORIDE 10 MG/1
10 CAPSULE, GELATIN COATED ORAL ONCE
Status: COMPLETED | OUTPATIENT
Start: 2022-01-14 | End: 2022-01-14

## 2022-01-14 RX ORDER — CHLORDIAZEPOXIDE HYDROCHLORIDE 25 MG/1
25 CAPSULE, GELATIN COATED ORAL EVERY 4 HOURS PRN
Status: DISCONTINUED | OUTPATIENT
Start: 2022-01-14 | End: 2022-01-18

## 2022-01-14 RX ORDER — LORAZEPAM 1 MG/1
4 TABLET ORAL
Status: DISCONTINUED | OUTPATIENT
Start: 2022-01-14 | End: 2022-01-20 | Stop reason: HOSPADM

## 2022-01-14 RX ORDER — GABAPENTIN 400 MG/1
400 CAPSULE ORAL 3 TIMES DAILY
Status: DISCONTINUED | OUTPATIENT
Start: 2022-01-14 | End: 2022-01-20 | Stop reason: HOSPADM

## 2022-01-14 RX ORDER — HYDROXYZINE HYDROCHLORIDE 50 MG/ML
50 INJECTION, SOLUTION INTRAMUSCULAR EVERY 6 HOURS PRN
Status: DISCONTINUED | OUTPATIENT
Start: 2022-01-14 | End: 2022-01-20 | Stop reason: HOSPADM

## 2022-01-14 RX ORDER — MAGNESIUM HYDROXIDE/ALUMINUM HYDROXICE/SIMETHICONE 120; 1200; 1200 MG/30ML; MG/30ML; MG/30ML
30 SUSPENSION ORAL PRN
Status: DISCONTINUED | OUTPATIENT
Start: 2022-01-14 | End: 2022-01-20 | Stop reason: HOSPADM

## 2022-01-14 RX ORDER — LORAZEPAM 2 MG/ML
4 INJECTION INTRAMUSCULAR
Status: DISCONTINUED | OUTPATIENT
Start: 2022-01-14 | End: 2022-01-20 | Stop reason: HOSPADM

## 2022-01-14 RX ORDER — BUPRENORPHINE HYDROCHLORIDE AND NALOXONE HYDROCHLORIDE DIHYDRATE 8; 2 MG/1; MG/1
1 TABLET SUBLINGUAL 2 TIMES DAILY
Status: DISCONTINUED | OUTPATIENT
Start: 2022-01-14 | End: 2022-01-20 | Stop reason: HOSPADM

## 2022-01-14 RX ORDER — LANOLIN ALCOHOL/MO/W.PET/CERES
100 CREAM (GRAM) TOPICAL DAILY
Status: DISCONTINUED | OUTPATIENT
Start: 2022-01-14 | End: 2022-01-20 | Stop reason: HOSPADM

## 2022-01-14 RX ORDER — HALOPERIDOL 5 MG/ML
5 INJECTION INTRAMUSCULAR EVERY 6 HOURS PRN
Status: DISCONTINUED | OUTPATIENT
Start: 2022-01-14 | End: 2022-01-20 | Stop reason: HOSPADM

## 2022-01-14 RX ORDER — LORAZEPAM 2 MG/ML
1 INJECTION INTRAMUSCULAR ONCE
Status: DISCONTINUED | OUTPATIENT
Start: 2022-01-14 | End: 2022-01-14

## 2022-01-14 RX ORDER — CHLORDIAZEPOXIDE HYDROCHLORIDE 25 MG/1
50 CAPSULE, GELATIN COATED ORAL
Status: DISCONTINUED | OUTPATIENT
Start: 2022-01-14 | End: 2022-01-18

## 2022-01-14 RX ORDER — CLONIDINE HYDROCHLORIDE 0.1 MG/1
0.1 TABLET ORAL 2 TIMES DAILY PRN
Status: DISCONTINUED | OUTPATIENT
Start: 2022-01-14 | End: 2022-01-20 | Stop reason: HOSPADM

## 2022-01-14 RX ORDER — TRAZODONE HYDROCHLORIDE 100 MG/1
100 TABLET ORAL NIGHTLY PRN
Status: DISCONTINUED | OUTPATIENT
Start: 2022-01-14 | End: 2022-01-20 | Stop reason: HOSPADM

## 2022-01-14 RX ORDER — 0.9 % SODIUM CHLORIDE 0.9 %
2000 INTRAVENOUS SOLUTION INTRAVENOUS ONCE
Status: DISCONTINUED | OUTPATIENT
Start: 2022-01-14 | End: 2022-01-20 | Stop reason: HOSPADM

## 2022-01-14 RX ORDER — LORAZEPAM 1 MG/1
1 TABLET ORAL ONCE
Status: COMPLETED | OUTPATIENT
Start: 2022-01-14 | End: 2022-01-14

## 2022-01-14 RX ORDER — BENZTROPINE MESYLATE 1 MG/ML
2 INJECTION INTRAMUSCULAR; INTRAVENOUS 2 TIMES DAILY PRN
Status: DISCONTINUED | OUTPATIENT
Start: 2022-01-14 | End: 2022-01-20 | Stop reason: HOSPADM

## 2022-01-14 RX ORDER — CHLORDIAZEPOXIDE HYDROCHLORIDE 5 MG/1
10 CAPSULE, GELATIN COATED ORAL EVERY 4 HOURS PRN
Status: DISCONTINUED | OUTPATIENT
Start: 2022-01-14 | End: 2022-01-18

## 2022-01-14 RX ORDER — MULTIVITAMIN WITH IRON
1 TABLET ORAL DAILY
Status: DISCONTINUED | OUTPATIENT
Start: 2022-01-14 | End: 2022-01-20 | Stop reason: HOSPADM

## 2022-01-14 RX ORDER — ACETAMINOPHEN 325 MG/1
650 TABLET ORAL EVERY 4 HOURS PRN
Status: DISCONTINUED | OUTPATIENT
Start: 2022-01-14 | End: 2022-01-20 | Stop reason: HOSPADM

## 2022-01-14 RX ORDER — HALOPERIDOL 5 MG
5 TABLET ORAL EVERY 6 HOURS PRN
Status: DISCONTINUED | OUTPATIENT
Start: 2022-01-14 | End: 2022-01-20 | Stop reason: HOSPADM

## 2022-01-14 RX ORDER — FOLIC ACID 1 MG/1
1 TABLET ORAL DAILY
Status: DISCONTINUED | OUTPATIENT
Start: 2022-01-14 | End: 2022-01-20 | Stop reason: HOSPADM

## 2022-01-14 RX ORDER — HYDROXYZINE PAMOATE 50 MG/1
50 CAPSULE ORAL EVERY 6 HOURS PRN
Status: DISCONTINUED | OUTPATIENT
Start: 2022-01-14 | End: 2022-01-20 | Stop reason: HOSPADM

## 2022-01-14 RX ADMIN — BUPRENORPHINE HYDROCHLORIDE AND NALOXONE HYDROCHLORIDE DIHYDRATE 1 TABLET: 8; 2 TABLET SUBLINGUAL at 11:55

## 2022-01-14 RX ADMIN — CHLORDIAZEPOXIDE HYDROCHLORIDE 25 MG: 25 CAPSULE ORAL at 18:18

## 2022-01-14 RX ADMIN — THERA TABS 1 TABLET: TAB at 11:55

## 2022-01-14 RX ADMIN — CHLORDIAZEPOXIDE HYDROCHLORIDE 25 MG: 25 CAPSULE ORAL at 14:33

## 2022-01-14 RX ADMIN — LORAZEPAM 1 MG: 1 TABLET ORAL at 05:27

## 2022-01-14 RX ADMIN — CLONIDINE HYDROCHLORIDE 0.1 MG: 0.1 TABLET ORAL at 16:48

## 2022-01-14 RX ADMIN — CHLORDIAZEPOXIDE HYDROCHLORIDE 10 MG: 10 CAPSULE ORAL at 10:43

## 2022-01-14 RX ADMIN — HALOPERIDOL 5 MG: 5 TABLET ORAL at 18:21

## 2022-01-14 RX ADMIN — GABAPENTIN 400 MG: 400 CAPSULE ORAL at 20:54

## 2022-01-14 RX ADMIN — GABAPENTIN 400 MG: 400 CAPSULE ORAL at 14:00

## 2022-01-14 RX ADMIN — FOLIC ACID 1 MG: 1 TABLET ORAL at 11:55

## 2022-01-14 RX ADMIN — Medication 650 MG: at 16:48

## 2022-01-14 RX ADMIN — Medication 100 MG: at 11:55

## 2022-01-14 RX ADMIN — BUPRENORPHINE HYDROCHLORIDE AND NALOXONE HYDROCHLORIDE DIHYDRATE 1 TABLET: 8; 2 TABLET SUBLINGUAL at 20:54

## 2022-01-14 RX ADMIN — HYDROXYZINE PAMOATE 50 MG: 50 CAPSULE ORAL at 16:48

## 2022-01-14 ASSESSMENT — SLEEP AND FATIGUE QUESTIONNAIRES
DO YOU USE A SLEEP AID: NO
AVERAGE NUMBER OF SLEEP HOURS: 12
SLEEP PATTERN: NORMAL
DO YOU HAVE DIFFICULTY SLEEPING: NO

## 2022-01-14 ASSESSMENT — PAIN SCALES - GENERAL
PAINLEVEL_OUTOF10: 1
PAINLEVEL_OUTOF10: 3

## 2022-01-14 ASSESSMENT — LIFESTYLE VARIABLES: HISTORY_ALCOHOL_USE: YES

## 2022-01-14 NOTE — ED NOTES
Attempts made to dispo with Dr. Ina Cardoso, voicemail full. Message left on 3W.      Velia Aschoff, RN  01/14/22 6234

## 2022-01-14 NOTE — ED NOTES
Report received from [facility],[place] 100. Resting quietly in bed at this time.       Lesly Javier RN  01/14/22 6619

## 2022-01-14 NOTE — PROGRESS NOTES
Behavioral Services  Medicare Certification Upon Admission    I certify that this patient's inpatient psychiatric hospital admission is medically necessary for:    [x] (1) Treatment which could reasonably be expected to improve this patient's condition,       [x] (2) Or for diagnostic study;     AND     [x](2) The inpatient psychiatric services are provided while the individual is under the care of a physician and are included in the individualized plan of care.     Estimated length of stay/service 3-5 days    Plan for post-hospital care OP care    Electronically signed by Dina Sheehan MD on 1/14/2022 at 11:32 AM

## 2022-01-14 NOTE — ED NOTES
Call placed to provider to update on CIWA score. Will call back.       Baby Skiff, RN  01/14/22 6423
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
## 2022-01-14 NOTE — ED NOTES
[facility] called, requested face sheet with SSN      Faxed over     Lizzy Hidalgo RN  01/14/22 0053

## 2022-01-14 NOTE — FLOWSHEET NOTE
Patient arrived to unit. Skin check and contraband check performed by this nurse and Axel Yanes R.N contraband negative,  Patient has very bad calloused toes on feet, he has reddened area on top of left foot and also a swollen red/brown area on right third finger.

## 2022-01-14 NOTE — ED NOTES
Report given to [address] [place] [address] Drive on 3W.       Sagrario Flor RN  01/14/22 9028

## 2022-01-14 NOTE — ED NOTES
Spoke with Katarina Sanchez at [facility], [facility], CK below 500 ETOH less than 80 (informed draw at 1943 was 32), and EKG.       William David RN  01/13/22 2507

## 2022-01-14 NOTE — ED NOTES
Awaiting final disposition from Dr. Erick Rivera per report from [facility]. Resting with eyes closed & no acute distress noted.      Myranda Bronson RN  01/14/22 8730

## 2022-01-14 NOTE — PROGRESS NOTES
[facility] AT [place] Respiratory Therapy Evaluation   Current Order:  Q4PRN ALBUTEROL      Home Regimen: PRN      Ordering Physician: DR Yola Aguirre  Re-evaluation Date:  NA     Diagnosis: POLYSUBSTANCE ABUSE      Patient Status: Stable / Unstable + Physician notified    The following MDI Criteria must be met in order to convert aerosol to MDI with spacer. If unable to meet, MDI will be converted to aerosol:  []  Patient able to demonstrate the ability to use MDI effectively  []  Patient alert and cooperative  []  Patient able to take deep breath with 5-10 second hold  []  Medication(s) available in this delivery method   []  Peak flow greater than or equal to 200 ml/min            Current Order Substituted To  (same drug, same frequency)   Aerosol to MDI [] Albuterol Sulfate 0.083% unit dose by aerosol Albuterol Sulfate MDI 2 puffs by inhalation with spacer    [] Levalbuterol 1.25 mg unit dose by aerosol Levalbuterol MDI 2 puffs by inhalation with spacer    [] Levalbuterol 0.63 mg unit dose by aerosol Levalbuterol MDI 2 puffs by inhalation with spacer    [] Ipratropium Bromide 0.02% unit dose by aerosol Ipratropium Bromide MDI 2 puffs by inhalation with spacer    [] Duoneb (Ipratropium + Albuterol) unit dose by aerosol Ipratropium MDI + Albuterol MDI 2 puffs by inhalation w/spacer   MDI to Aerosol [] Albuterol Sulfate MDI Albuterol Sulfate 0.083% unit dose by aerosol    [] Levalbuterol MDI 2 puffs by inhalation Levalbuterol 1.25 mg unit dose by aerosol    [] Ipratropium Bromide MDI by inhalation Ipratropium Bromide 0.02% unit dose by aerosol    [] Combivent (Ipratropium + Albuterol) MDI by inhalation Duoneb (Ipratropium + Albuterol) unit dose by aerosol       Treatment Assessment [Frequency/Schedule]:  Change frequency to: ___________NO CHANGES________________per Protocol, P&T, MEC      Points 0 1 2 3 4   Pulmonary Status  Non-Smoker  []   Smoking history   < 20 pack years  [x]   Smoking history  ?  20 pack years  [] Pulmonary Disorder  (acute or chronic)  []   Severe or Chronic w/ Exacerbation  []     Surgical Status No [x]   Surgeries     General []   Surgery Lower []   Abdominal Thoracic or []   Upper Abdominal Thoracic with  PulmonaryDisorder  []     Chest X-ray Clear/Not  Ordered     [x]  Chronic Changes  Results Pending  []  Infiltrates, atelectasis, pleural effusion, or edema  []  Infiltrates in more than one lobe []  Infiltrate + Atelectasis, &/or pleural effusion  []    Respiratory Pattern Regular,  RR = 12-20 [x]  Increased,  RR = 21-25 []  BROWN, irregular,  or RR = 26-30 []  Decreased FEV1  or RR = 31-35 []  Severe SOB, use  of accessory muscles, or RR ? 35  []    Mental Status Alert, oriented,  Cooperative [x]  Confused but Follows commands []  Lethargic or unable to follow commands []  Obtunded  []  Comatose  []    Breath Sounds Clear to  auscultation  [x]  Decreased unilaterally or  in bases only []  Decreased  bilaterally  []  Crackles or intermittent wheezes []  Wheezes []    Cough Strong, Spontan., & nonproductive [x]  Strong,  spontaneous, &  productive []  Weak,  Nonproductive []  Weak, productive or  with wheezes []  No spontaneous  cough or may require suctioning []    Level of Activity Ambulatory [x]  Ambulatory w/ Assist  []  Non-ambulatory []  Paraplegic []  Quadriplegic []    Total    Score:___1____     Triage Score:____5____      Tri       Triage:     1. (>20) Freq: Q3    2. (16-20) Freq: Q4   3. (11-15) Freq: QID & Albuterol Q2 PRN    4. (6-10) Freq: TID & Albuterol Q2 PRN    5. (0-5) Freq Q4prn

## 2022-01-14 NOTE — PROGRESS NOTES
Patient did not attend group despite staff encouragement.   Electronically signed by Jordan Adams on 1/14/2022 at 5:27 PM

## 2022-01-14 NOTE — PROGRESS NOTES
Pt. presents tense. Tremulous and diaphoretic. Reports coming to the ER for ETOH detox and had already been given librium and was waiting for suboxoen to be given. Reports poor appetite and increased sleep. Poor concentration and not good memory. Low motivation. Fearful to leave house. Feels paranoid at times. Reports AH/and sometimes has VH. Felt suicidal prior to admission and denies hi. \"I drink to silence the voices. \" Drinks ETOH daily,  denies smoking marijuana, and last used cocaine 5 weeks ago. Is a  by [employer] but hasnt worked in [employer]. Stressors include  1.current way life is-Im not doing anything  2. fearful-I dont want to go outside  3.my dog-who is really taking care of him the right way right now?   *outdoors activities, dog, watch Netflix, listen to music  Electronically signed by Zoe Bradford on 1/14/2022 at 12:44 PM

## 2022-01-14 NOTE — ED NOTES
Resting with eyes closed with HOB up. Breakfast tray placed @ bedside.      Shania Jason RN  01/14/22 8803

## 2022-01-14 NOTE — ED NOTES
Spoke with Sarwat Mcpherson PA-C, informed of need for medical clearance.       Lizzy Hidalgo RN  01/13/22 1954

## 2022-01-14 NOTE — ED NOTES
Call placed to [facility] for belongings and escort to [facility].       Clarita Toribio RN  01/14/22 9103
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
## 2022-01-14 NOTE — CARE COORDINATION
Completed referral to [facility].  Electronically signed by Corine Meckel , LISW-S on 1/14/2022 at 2:21 PM

## 2022-01-14 NOTE — ED NOTES
Awake & notified of [facility] admit earlier. [facility] notified of need for Patient belongings & 3 [facility] escort.      Mery Avila RN  01/14/22 1680

## 2022-01-14 NOTE — ED NOTES
Patient resting quietly respirations are even and unlabored no distress noted at this time.        Greta Lopez RN  01/14/22 7957

## 2022-01-14 NOTE — ED NOTES
Patient resting quietly respirations are even and unlabored no distress noted at this time.      Teodoro Encarnacion RN  01/14/22 3156

## 2022-01-14 NOTE — ED NOTES
Call placed [facility] for bed assignment and report. Unit to call back.       Dominick Yates RN  01/14/22 1016

## 2022-01-14 NOTE — PROGRESS NOTES
1621 - Requested something for voices and received Haldol 5 mg  2000 - Good results from Haldol given, no voices present

## 2022-01-14 NOTE — ED NOTES
Patient reviewed with Dr. Erick Rivera. Discussed events leading to admit, current assessment, labs, home medications, toxicology and substance abuse history.  Received order to admit to 3[facility] with Librium protocol for ETOH withdrawal.      Clarita Toribio RN  01/14/22 5527

## 2022-01-14 NOTE — ED NOTES
Patient appears to be asleep, respirations even and unlabored. No s/s of distress noted.       Sayra Herron RN  01/14/22 2236

## 2022-01-15 PROCEDURE — 1240000000 HC EMOTIONAL WELLNESS R&B

## 2022-01-15 PROCEDURE — 93005 ELECTROCARDIOGRAM TRACING: CPT | Performed by: PSYCHIATRY & NEUROLOGY

## 2022-01-15 PROCEDURE — 6360000002 HC RX W HCPCS: Performed by: PSYCHIATRY & NEUROLOGY

## 2022-01-15 PROCEDURE — 6370000000 HC RX 637 (ALT 250 FOR IP): Performed by: PSYCHIATRY & NEUROLOGY

## 2022-01-15 RX ADMIN — Medication 100 MG: at 09:42

## 2022-01-15 RX ADMIN — HALOPERIDOL 5 MG: 5 TABLET ORAL at 18:30

## 2022-01-15 RX ADMIN — BUPRENORPHINE HYDROCHLORIDE AND NALOXONE HYDROCHLORIDE DIHYDRATE 1 TABLET: 8; 2 TABLET SUBLINGUAL at 21:33

## 2022-01-15 RX ADMIN — GABAPENTIN 400 MG: 400 CAPSULE ORAL at 09:42

## 2022-01-15 RX ADMIN — BUPRENORPHINE HYDROCHLORIDE AND NALOXONE HYDROCHLORIDE DIHYDRATE 1 TABLET: 8; 2 TABLET SUBLINGUAL at 09:42

## 2022-01-15 RX ADMIN — CHLORDIAZEPOXIDE HYDROCHLORIDE 25 MG: 25 CAPSULE ORAL at 18:25

## 2022-01-15 RX ADMIN — FOLIC ACID 1 MG: 1 TABLET ORAL at 09:43

## 2022-01-15 RX ADMIN — GABAPENTIN 400 MG: 400 CAPSULE ORAL at 21:33

## 2022-01-15 RX ADMIN — HALOPERIDOL 5 MG: 5 TABLET ORAL at 11:05

## 2022-01-15 RX ADMIN — CHLORDIAZEPOXIDE HYDROCHLORIDE 10 MG: 5 CAPSULE ORAL at 11:05

## 2022-01-15 RX ADMIN — GABAPENTIN 400 MG: 400 CAPSULE ORAL at 14:23

## 2022-01-15 RX ADMIN — THERA TABS 1 TABLET: TAB at 09:42

## 2022-01-15 NOTE — GROUP NOTE
Group Therapy Note    Date: 1/15/2022    Group Start Time: 1615  Group End Time: 1700  Group Topic: Healthy Living/Wellness    [facility] I    Camilo Fitzgerald        Group Therapy Note    Attendees: 8         Patient's Goal:  To discuss socialization skills and practice them by playing a Choister activity    Notes:  Pt  participated in group activity    Status After Intervention:  Unchanged    Participation Level: Minimal    Participation Quality: Attentive      Speech:  mute      Thought Process/Content: Logical      Affective Functioning: Congruent      Mood: euthymic      Level of consciousness:  Alert      Response to Learning: Able to retain information      Endings: None Reported    Modes of Intervention: Education, Socialization and Activity      Discipline Responsible: [address]      Signature:  Camilo Fitzgerald

## 2022-01-15 NOTE — PROGRESS NOTES
Explained and gave all am meds, Bp 117/81, 79,no tremors or sweats noted, gave ordered subox 8.2 sl with staff at side till dissolved.

## 2022-01-15 NOTE — PROGRESS NOTES
Patient did not attend group despite staff encouragement.   Electronically signed by Joana Escobedo on 1/14/2022 at 9:47 PM

## 2022-01-15 NOTE — PROGRESS NOTES
Patient did not attend group despite staff encouragement.   Electronically signed by Evans Brambila on 1/14/2022 at 10:06 PM

## 2022-01-15 NOTE — FLOWSHEET NOTE
Pt was sitting in the dayroom and stated he needed \"something for the voices\" pt states the voices say \" you are going to [facility] and you are no good\". Pt was diaphoretic with beads of sweat on his forehead and tremors. Pt rated a 10 on the CIWA and was medicated with 25 mg of librium po. Will continue to monitor. Pt denies SI,HI,.

## 2022-01-15 NOTE — PROGRESS NOTES
BEHAVIORAL HEALTH FOLLOW-UP NOTE   THE PATIENT WAS SEEN THROUGH TELEPSYCHIATRY, IN A REAL-TIME, AUDIO-VIDEO ENCOUNTER, WITH THE PATIENT IN [place], [place] AND THE PROVIDER IN [place], [place]      1/15/2022     Patient was seen and examined in person, Chart reviewed   Patient's case discussed with staff/team    Chief Complaint: depressed mood, suicidal ideation    Interim History:     Patient said he still felt like he was having a \"bad withdrawal\", and \"terrible anxiety\". He said he had auditory hallucinations, as voices telling him that \"they're going to come to kill me any my dog\" and visual hallucinations which he finds \"hard to describe\". He said he is still depressed. He denied suicidal or homicidal ideation. He is feeling paranoid. Appetite:   [] Normal/Unchanged  [] Increased  [x] Decreased      Sleep:       [] Normal/Unchanged  [] Fair       [x] Poor              Energy:    [] Normal/Unchanged  [] Increased  [x] Decreased        SI [] Present  [x] Absent    HI  []Present  [x] Absent     Aggression:  [] yes  [x] no    Patient is [] able  [x] unable to CONTRACT FOR SAFETY     PAST MEDICAL/PSYCHIATRIC HISTORY:   Past Medical History:   Diagnosis Date    Depression     Neuropathy        FAMILY/SOCIAL HISTORY:  History reviewed. No pertinent family history.   Social History     Socioeconomic History    Marital status: Single     Spouse name: Not on file    Number of children: Not on file    Years of education: Not on file    Highest education level: Not on file   Occupational History    Not on file   Tobacco Use    Smoking status: Current Some Day Smoker     Packs/day: 0.50     Years: 18.00     Pack years: 9.00     Types: Cigarettes    Smokeless tobacco: Never Used   Vaping Use    Vaping Use: Former   Substance and Sexual Activity    Alcohol use: Yes     Comment: A 5th a day for the past year    Drug use: Not Currently     Comment: sober from cocaine for 5 weeks    Sexual activity: Not Currently   Other Topics Concern    Not on file   Social History Narrative    Not on file     Social Determinants of Health     Financial Resource Strain:     Difficulty of Paying Living Expenses: Not on file   Food Insecurity:     Worried About Running Out of Food in the Last Year: Not on file    [place] of Food in the Last Year: Not on file   Transportation Needs:     Lack of Transportation (Medical): Not on file    Lack of Transportation (Non-Medical): Not on file   Physical Activity:     Days of Exercise per Week: Not on file    Minutes of Exercise per Session: Not on file   Stress:     Feeling of Stress : Not on file   Social Connections:     Frequency of Communication with Friends and Family: Not on file    Frequency of Social Gatherings with Friends and Family: Not on file    Attends Gnosticism Services: Not on file    Active Member of [address] [place] [address] [employer] or Organizations: Not on file    Attends Club or Organization Meetings: Not on file    Marital Status: Not on file   Intimate Partner Violence:     Fear of Current or Ex-Partner: Not on file    Emotionally Abused: Not on file    Physically Abused: Not on file    Sexually Abused: Not on file   Housing Stability:     Unable to Pay for Housing in the Last Year: Not on file    Number of [place] in the Last Year: Not on file    Unstable Housing in the Last Year: Not on file           ROS:  [x] All negative/unchanged except if checked.  Explain positive(checked items) below:  [] Constitutional  [] Eyes  [] Ear/Nose/Mouth/Throat  [] Respiratory  [] CV  [] GI  []   [] Musculoskeletal  [] Skin/Breast  [] Neurological  [] Endocrine  [] Heme/Lymph  [] Allergic/Immunologic    Explanation:     MEDICATIONS:    Current Facility-Administered Medications:     0.9 % sodium chloride bolus, 2,000 mL, IntraVENous, Once, Nora Barr PA-C    albuterol sulfate  (90 Base) MCG/ACT inhaler 2 puff, 2 puff, Inhalation, Q4H PRN, Michael Armstrong MD    buprenorphine-naloxone (SUBOXONE) 8-2 MG SL tablet 1 tablet, 1 tablet, SubLINGual, BID, Filemon Lopez MD, 1 tablet at 01/15/22 2236    cloNIDine (CATAPRES) tablet 0.1 mg, 0.1 mg, Oral, BID PRN, Filemon Lopez MD, 0.1 mg at 01/14/22 1648    traZODone (DESYREL) tablet 100 mg, 100 mg, Oral, Nightly PRN, Filemon Lopez MD    gabapentin (NEURONTIN) capsule 400 mg, 400 mg, Oral, TID, Filemon Lopez MD, 400 mg at 01/15/22 1423    acetaminophen (TYLENOL) tablet 650 mg, 650 mg, Oral, Q4H PRN, Filemon Lopez MD, 650 mg at 01/14/22 1648    magnesium hydroxide (MILK OF MAGNESIA) 400 MG/5ML suspension 30 mL, 30 mL, Oral, Daily PRN, Filemon Lopez MD    aluminum & magnesium hydroxide-simethicone (MAALOX) 200-200-20 MG/5ML suspension 30 mL, 30 mL, Oral, PRN, Filemon Lopez MD    haloperidol (HALDOL) tablet 5 mg, 5 mg, Oral, Q6H PRN, 5 mg at 01/15/22 1105 **OR** haloperidol lactate (HALDOL) injection 5 mg, 5 mg, IntraMUSCular, Q6H PRN, Filemon Lopez MD    benztropine mesylate (COGENTIN) injection 2 mg, 2 mg, IntraMUSCular, BID PRN, Filemon Lopez MD    hydrOXYzine (VISTARIL) injection 50 mg, 50 mg, IntraMUSCular, Q6H PRN **OR** hydrOXYzine (VISTARIL) capsule 50 mg, 50 mg, Oral, Q6H PRN, Filemon Lopez MD, 50 mg at 01/14/22 1648    chlordiazePOXIDE (LIBRIUM) capsule 10 mg, 10 mg, Oral, Q4H PRN, 10 mg at 01/15/22 1105 **OR** chlordiazePOXIDE (LIBRIUM) capsule 25 mg, 25 mg, Oral, Q4H PRN, 25 mg at 01/14/22 1818 **OR** chlordiazePOXIDE (LIBRIUM) capsule 50 mg, 50 mg, Oral, Q2H PRN **OR** chlordiazePOXIDE (LIBRIUM) capsule 75 mg, 75 mg, Oral, Q1H PRN **OR** LORazepam (ATIVAN) tablet 4 mg, 4 mg, Oral, Q1H PRN **OR** LORazepam (ATIVAN) injection 4 mg, 4 mg, IntraVENous, Q1H PRN, Filemon Lopez MD    thiamine tablet 100 mg, 100 mg, Oral, Daily, Filemon Lopez MD, 100 mg at 01/15/22 1729    multivitamin 1 tablet, 1 tablet, Oral, Daily, Filemon Lopez MD, 1 tablet at 67/48/25 6574    folic acid (FOLVITE) tablet 2.330 01/13/2022     No results found for: LITHIUM  No results found for: VALPROATE, CBMZ    RISK ASSESSMENT:   Suicide risk: high    Treatment Plan:  Reviewed current Medications with the patient. Will check EKG (the last one had a prolonged QTc of 490 msec) and will adjust medications accordingly for psychotic symptoms. EKG was ordered  Risks, benefits, side effects, drug-to-drug interactions and alternatives to treatment were discussed. Collateral information: pending  CD evaluation   Encourage patient to attend group and other milieu activities.   Discharge planning discussed with the patient and treatment team.    PSYCHOTHERAPY/COUNSELING:  [x] Therapeutic interview  [x] Supportive  [] CBT  [] Ongoing  [] Other    [x] Patient continues to need, on a daily basis, active treatment furnished directly by or requiring the supervision of inpatient psychiatric personnel      Anticipated Length of stay:            Electronically signed by Dian Rivera MD on 1/15/2022 at 4:10 PM
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
## 2022-01-15 NOTE — GROUP NOTE
Group Therapy Note    Date: 1/15/2022    Group Start Time: 1100  Group End Time: 2266  Group Topic: Psychoeducation    [facility]    RUTH Perkins, REFUGIO        Group Therapy Note    Attendees: 7         Patient's Goal:  To participate in a psychoeducational group therapy    Notes:  Patient participated in \"check in\"  He shared that he will be continuing mental health therapy after he is discharged.      Status After Intervention:  Improved    Participation Level: Interactive    Participation Quality: Sharing      Speech:  normal      Thought Process/Content: Logical      Affective Functioning: Congruent      Mood: calm      Level of consciousness:  Alert      Response to Learning: Able to verbalize current knowledge/experience      Endings: None Reported    Modes of Intervention: Education      Discipline Responsible: /Counselor      Signature:  RUTH Perkins, REFUGIO

## 2022-01-15 NOTE — PROGRESS NOTES
Morning Community Meeting Topics    Susi Nichole attended the morning community meeting on 1/15/22. Topics discussed today     [x] Introduction   Day of the week and date   Mask distribution   Current mask requirements  [x]Teams   Explanation of  Green and Blue team criteria   Nurses assigned to each team for today   Explanation about green and blue paper  o Date  o Patient's Name  o Patient's Nurse  o Goals  [x] Visitation   Announce the visiting hours for the day   Announce which team is allowed to have visitors for the day   Review any updated Covid 19 requirements for visitors during visitation  o Vaccine Card or negative Covid test within 48 hours of visit  o State Identification   Patients are reminded to alert the  at least 1 hour before visitation   [x] Unit Orientation   Coffee use   Phone location and etiquette   Shower locations  [employer] and dryer location and process   Common area expectations   Staff rounds expectation  [x] Meals    Educate patient to the menu  o The patient is encouraged to fill out the menu to get preferences at mealtime  o The patient is educated that if they do not fill out the menu, they will get the standard tray  o The coffee pot is decaf, patient encouraged to order regular coffee from menu.    Educate patient to the meal process   Patient encouraged to eat snacks provided twice daily  o Snacks may stay in patient room     [x] Discharge Process   Discharge expectations   Fill out the survey after discharge   [x] Hygiene   Daily showers encouraged  o Showers availability discussed    Daily dressing encouraged  o Discussed wearing street clothing   Education provided on where to place linens and clothing  o Linens in the hamper  o personal clothing does not go into the linen hamper  [x] Group    Patient encouraged to attend group provided   Time of Group Meetings discussed   Gentle reminder that attendance is a Physician order  [x] Movement   Chair exercises completed   Stretching completed  Notes: GOAL : \" to feel better\" Electronically signed by Lukas Womack on 1/15/2022 at 9:39 AM

## 2022-01-15 NOTE — PROGRESS NOTES
Patient CIWA @2330 score 0. Patient observed resting in bed with eyes closed, chest rise even, breathing unlabored.

## 2022-01-15 NOTE — PROGRESS NOTES
Patient has spent much of the day in bed. He reports feeling like he is actively withdrawing. Hands are tremulous and patient feels uncomfortable. He is struggling with auditory hallucinations that tell him they are going to kill him and his dog. He denies suicidal or homicidal intent. He wants to feel better and get off alcohol. He shared he got off the drugs he was doing and alcohol is next. He knows he needs help with hallucinations to be able to manage.

## 2022-01-15 NOTE — PROGRESS NOTES
Pt. refused to attend the 1000 skills group, despite staff encouragement.  Electronically signed by Cari Roberts on 1/15/2022 at 11:11 AM

## 2022-01-16 PROCEDURE — 1240000000 HC EMOTIONAL WELLNESS R&B

## 2022-01-16 PROCEDURE — 6370000000 HC RX 637 (ALT 250 FOR IP): Performed by: PSYCHIATRY & NEUROLOGY

## 2022-01-16 PROCEDURE — 6360000002 HC RX W HCPCS: Performed by: PSYCHIATRY & NEUROLOGY

## 2022-01-16 RX ADMIN — CHLORDIAZEPOXIDE HYDROCHLORIDE 10 MG: 5 CAPSULE ORAL at 10:11

## 2022-01-16 RX ADMIN — TRAZODONE HYDROCHLORIDE 100 MG: 100 TABLET ORAL at 21:11

## 2022-01-16 RX ADMIN — CHLORDIAZEPOXIDE HYDROCHLORIDE 10 MG: 5 CAPSULE ORAL at 14:54

## 2022-01-16 RX ADMIN — BUPRENORPHINE HYDROCHLORIDE AND NALOXONE HYDROCHLORIDE DIHYDRATE 1 TABLET: 8; 2 TABLET SUBLINGUAL at 08:57

## 2022-01-16 RX ADMIN — HYDROXYZINE PAMOATE 50 MG: 50 CAPSULE ORAL at 17:32

## 2022-01-16 RX ADMIN — HALOPERIDOL 5 MG: 5 TABLET ORAL at 16:05

## 2022-01-16 RX ADMIN — GABAPENTIN 400 MG: 400 CAPSULE ORAL at 21:11

## 2022-01-16 RX ADMIN — GABAPENTIN 400 MG: 400 CAPSULE ORAL at 13:40

## 2022-01-16 RX ADMIN — HALOPERIDOL 5 MG: 5 TABLET ORAL at 10:12

## 2022-01-16 RX ADMIN — HYDROXYZINE PAMOATE 50 MG: 50 CAPSULE ORAL at 08:56

## 2022-01-16 RX ADMIN — THERA TABS 1 TABLET: TAB at 08:56

## 2022-01-16 RX ADMIN — Medication 100 MG: at 08:56

## 2022-01-16 RX ADMIN — BUPRENORPHINE HYDROCHLORIDE AND NALOXONE HYDROCHLORIDE DIHYDRATE 1 TABLET: 8; 2 TABLET SUBLINGUAL at 21:11

## 2022-01-16 RX ADMIN — FOLIC ACID 1 MG: 1 TABLET ORAL at 08:56

## 2022-01-16 RX ADMIN — GABAPENTIN 400 MG: 400 CAPSULE ORAL at 08:56

## 2022-01-16 NOTE — PROGRESS NOTES
Patient is slightly tremulous but states he feels better than he did yesterday. Hallucinations are bad telling him they will kill him,and that he will go to group home for life. Patient states his dreams were bad and that is rather typical for him. He denies SI, HI. He appears watchful and guarded.

## 2022-01-16 NOTE — PROGRESS NOTES
Pt is noted to be walking the matthews now, ate a good breakfast, reports generalized anxiety #8, vistaril was offered and given 50mg ,neurontin 400mg , sobox sl observed until dissolved, not tremors or sweats noted.

## 2022-01-16 NOTE — FLOWSHEET NOTE
Pt requesting a medication for anxiety and was medicated with 50 mg vistaril po for anxiety rated a 9/10. Pt reported he tried walking to lessen the anxiety and reported it helped a little. Pt does report the haldol \" helped \" with the voices.

## 2022-01-16 NOTE — GROUP NOTE
Group Therapy Note    Date: 1/16/2022    Group Start Time: 1000  Group End Time: 1100  Group Topic: Psychoeducation    [facility]    Errol Neely, CTRS        Group Therapy Note    Attendees: 7         Patient's Goal:  \"to feel better\"    Notes:  Pt. attended the 1000 skill group. Quiet and to himself. Initially wanted to listen to music and eventually did a puzzle. Calm. Status After Intervention:  Improved    Participation Level:  Active Listener and Minimal    Participation Quality: Appropriate and Attentive      Speech:  normal      Thought Process/Content: Logical      Affective Functioning: Flat      Mood: depressed      Level of consciousness:  Alert, Oriented x4 and Attentive      Response to Learning: Able to change behavior and Progressing to goal      Endings: None Reported    Modes of Intervention: Education, Support, Socialization and Activity      Discipline Responsible: Psychoeducational Specialist      Signature:  Nikky Centeno

## 2022-01-16 NOTE — GROUP NOTE
Group Therapy Note    Date: 1/16/2022    Group Start Time: 1600  Group End Time: 2207  Group Topic: Healthy Living/Wellness    [facility]    [address] [place]        Group Therapy Note    Attendees: 8         Patient's Goal:  To discuss the effects of self esteem and share positive characteristics to describe self    Notes:  Pt participated in group discussion    Status After Intervention:  Improved    Participation Level:  Active Listener    Participation Quality: Appropriate and Attentive      Speech:  normal      Thought Process/Content: Logical      Affective Functioning: Congruent      Mood: euthymic      Level of consciousness:  Alert and Attentive      Response to Learning: Able to verbalize current knowledge/experience      Endings: None Reported    Modes of Intervention: Education, Socialization and Activity      Discipline Responsible: Behavorial Health Tech      Signature:  Jailyn Vera

## 2022-01-16 NOTE — GROUP NOTE
Group Therapy Note    Date: 1/16/2022    Group Start Time: 1400  Group End Time: 1440  Group Topic: Psychoeducation    [facility]    JAIME Basurto        Group Therapy Note    Attendees: 10         Patient's Goal:  To participate in the anger management game. Notes:  Patient was able to identify situations that triggers his anger and ways in which he typically responds. Status After Intervention:  Unchanged    Participation Level: Active Listener    Participation Quality: Appropriate      Speech:  normal      Thought Process/Content: Logical      Affective Functioning: Congruent      Mood: elevated      Level of consciousness:  Alert      Response to Learning: Able to verbalize current knowledge/experience      Endings: None Reported    Modes of Intervention: Education      Discipline Responsible: /Counselor      Signature:   JAIME Basurto

## 2022-01-16 NOTE — FLOWSHEET NOTE
Pt is visible out in the dayroom and appears guarded and watchful. Pt voiced to this writer \" the voices are bad\" . Pt requesting medication and accepted haldol for the voices. Pt denies suicidal or homicidal intent and reports wanting to feel better and get off the alcohol . Pt denies SI,HI,.

## 2022-01-16 NOTE — PROGRESS NOTES
Pt reports the voices started several months ago and use to be on Zyprexa at night, pt is requesting haldol 5mg and librium 2-5mg was offered, reports the withdraw is less, feeling sweetie and tremors are noted.

## 2022-01-16 NOTE — PROGRESS NOTES
Patient did not attend group despite staff encouragement.   Electronically signed by Latosha Roth on 1/15/2022 at 10:03 PM

## 2022-01-16 NOTE — CARE COORDINATION
Group Therapy Note    Date: 1/16/2022  Start Time: 1100  End Time:  0472    Number of Participants:7    Type of Group: Psychotherapy    Patient's Goal:  To participate in a goal oriented group. Notes: Patient declined to attend psychoeducation group at 1100 despite encouragement by staff.      Discipline Responsible: /Counselor    JAIME Vazquez

## 2022-01-16 NOTE — PROGRESS NOTES
Morning Community Meeting Topics    Carla Lillymarcy attended the morning community meeting on 1/16/22. Topics discussed today     [x] Introduction   Day of the week and date   Mask distribution   Current mask requirements  [x]Teams   Explanation of  Green and Blue team criteria   Nurses assigned to each team for today   Explanation about green and blue paper  o Date  o Patient's Name  o Patient's Nurse  o Goals  [x] Visitation   Announce the visiting hours for the day   Announce which team is allowed to have visitors for the day   Review any updated Covid 19 requirements for visitors during visitation  o Vaccine Card or negative Covid test within 48 hours of visit  o State Identification   Patients are reminded to alert the  at least 1 hour before visitation   [x] Unit Orientation   Coffee use   Phone location and etiquette   Shower locations  [employer] and dryer location and process   Common area expectations   Staff rounds expectation  [x] Meals    Educate patient to the menu  o The patient is encouraged to fill out the menu to get preferences at mealtime  o The patient is educated that if they do not fill out the menu, they will get the standard tray  o The coffee pot is decaf, patient encouraged to order regular coffee from menu.    Educate patient to the meal process   Patient encouraged to eat snacks provided twice daily  o Snacks may stay in patient room     [x] Discharge Process   Discharge expectations   Fill out the survey after discharge   [x] Hygiene   Daily showers encouraged  o Showers availability discussed    Daily dressing encouraged  o Discussed wearing street clothing   Education provided on where to place linens and clothing  o Linens in the hamper  o personal clothing does not go into the linen hamper  [x] Group    Patient encouraged to attend group provided   Time of Group Meetings discussed   Gentle reminder that attendance is a Physician order  [x] Movement   Chair exercises completed   Stretching completed  Notes:  GOAL:\" to feel better\" Electronically signed by Cari Roberts on 1/16/2022 at 11:27 AM

## 2022-01-16 NOTE — PROGRESS NOTES
Pt is requesting vistaril , stated the shaking is not to bad it is more the sweats, encouraged shower and to remove the layers of cloths,

## 2022-01-16 NOTE — PROGRESS NOTES
Patient did not attend group despite staff encouragement.   Electronically signed by Alina Finch on 1/15/2022 at 9:38 PM

## 2022-01-17 LAB
EKG ATRIAL RATE: 64 BPM
EKG P AXIS: 30 DEGREES
EKG P-R INTERVAL: 144 MS
EKG Q-T INTERVAL: 424 MS
EKG QRS DURATION: 84 MS
EKG QTC CALCULATION (BAZETT): 437 MS
EKG R AXIS: -44 DEGREES
EKG T AXIS: -8 DEGREES
EKG VENTRICULAR RATE: 64 BPM

## 2022-01-17 PROCEDURE — 6360000002 HC RX W HCPCS: Performed by: PSYCHIATRY & NEUROLOGY

## 2022-01-17 PROCEDURE — 6370000000 HC RX 637 (ALT 250 FOR IP): Performed by: PSYCHIATRY & NEUROLOGY

## 2022-01-17 PROCEDURE — 1240000000 HC EMOTIONAL WELLNESS R&B

## 2022-01-17 PROCEDURE — 99232 SBSQ HOSP IP/OBS MODERATE 35: CPT | Performed by: PSYCHIATRY & NEUROLOGY

## 2022-01-17 PROCEDURE — 93010 ELECTROCARDIOGRAM REPORT: CPT | Performed by: INTERNAL MEDICINE

## 2022-01-17 RX ADMIN — GABAPENTIN 400 MG: 400 CAPSULE ORAL at 09:38

## 2022-01-17 RX ADMIN — GABAPENTIN 400 MG: 400 CAPSULE ORAL at 20:51

## 2022-01-17 RX ADMIN — Medication 100 MG: at 09:37

## 2022-01-17 RX ADMIN — FOLIC ACID 1 MG: 1 TABLET ORAL at 09:38

## 2022-01-17 RX ADMIN — HALOPERIDOL 5 MG: 5 TABLET ORAL at 10:56

## 2022-01-17 RX ADMIN — BUPRENORPHINE HYDROCHLORIDE AND NALOXONE HYDROCHLORIDE DIHYDRATE 1 TABLET: 8; 2 TABLET SUBLINGUAL at 09:38

## 2022-01-17 RX ADMIN — THERA TABS 1 TABLET: TAB at 09:37

## 2022-01-17 RX ADMIN — BUPRENORPHINE HYDROCHLORIDE AND NALOXONE HYDROCHLORIDE DIHYDRATE 1 TABLET: 8; 2 TABLET SUBLINGUAL at 20:51

## 2022-01-17 RX ADMIN — GABAPENTIN 400 MG: 400 CAPSULE ORAL at 13:57

## 2022-01-17 RX ADMIN — CARIPRAZINE 1.5 MG: 1.5 CAPSULE, GELATIN COATED ORAL at 12:20

## 2022-01-17 NOTE — PROGRESS NOTES
Pt visible on unit this afternoon. Seen eating meals, attending groups and walking around unit. Reports the haldol helped his hallucinations \"a little bit\". Continues to experience AVH. Reports the voices tell him he is \"no good\", to kill himself and that they will hurt his dog. Explains that he sees \"patterns of people changing\". Denies any current SI or HI. Admits to anxiety 8/10. Reports that he walks to help cope with his anxiety. States he is excited to try his new medication as he has heard good things about vraylar. Hopeful medication change will help him as well. States he plans to continue to work with [address] [facility]. Remains pleasant and cooperative with staff and peers.

## 2022-01-17 NOTE — GROUP NOTE
Group Therapy Note    Date: 1/17/2022    Group Start Time: 1400  Group End Time: 1811  Group Topic: Psychoeducation    [facility]    RUTH Rooney LSW        Group Therapy Note    Attendees: 5         Patient's Goal:  To participate in a psychoeducational group therapy    Notes:  Patient participated in community resources group therapy    Status After Intervention:  Improved    Participation Level: Interactive    Participation Quality: Sharing      Speech:  normal      Thought Process/Content: Logical      Affective Functioning: Congruent      Mood: calm      Level of consciousness:  Alert      Response to Learning: Able to verbalize current knowledge/experience      Endings: Suicidality    Modes of Intervention: Education      Discipline Responsible: /Counselor      Signature:  RUTH Rooney LSW

## 2022-01-17 NOTE — GROUP NOTE
Group Therapy Note    Date: 1/17/2022    Group Start Time: 1630  Group End Time: 1700  Group Topic: Healthy Living/Wellness    [facility]    Aida Miles        Group Therapy Note    Attendees: 8/19         Patient's Goal:  To state positive attributes about ourselves and others while playing a game. Notes:  Patient actively participated in healthy living group.     Status After Intervention:  Improved    Participation Level: Interactive    Participation Quality: Appropriate, Attentive, Sharing and Supportive      Speech:  normal      Thought Process/Content: Logical      Affective Functioning: Congruent      Mood: euthymic      Level of consciousness:  Alert and Attentive      Response to Learning: Able to verbalize current knowledge/experience      Endings: None Reported    Modes of Intervention: Education      Discipline Responsible: [address], [employer]      Signature:  Aida Miles
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
## 2022-01-17 NOTE — PROGRESS NOTES
Ct out more this shift, A/O, denies any withdrawal from ETOH symptoms or pain at this time. Does not voice plans to harm self, is not presently hearing voices. Does plan to return home to grandfather.

## 2022-01-17 NOTE — PROGRESS NOTES
Pt requested Haldol for hearing voices that are telling him that they are coming to kill him and kill his dog. Pt just spoke with the doctor and is aware of Vraylar order.

## 2022-01-17 NOTE — PROGRESS NOTES
Pt. refused to attend the 1000 skills group, despite staff encouragement. Electronically signed by Betty Valenzuela, [address] on 1/17/2022 at 11:28 AM

## 2022-01-17 NOTE — GROUP NOTE
Group Therapy Note    Date: 1/17/2022    Group Start Time: 1100  Group End Time: 7843  Group Topic: Psychoeducation    [facility] RUTH Coe LSW        Group Therapy Note    Attendees: 9         Patient's Goal:  To participate in a psychoeducational group therapy    Notes:  Patient was cooperative during discussions and was supportive of other group members    Status After Intervention:  Improved    Participation Level: Interactive    Participation Quality: Sharing      Speech:  normal      Thought Process/Content: Logical      Affective Functioning: Congruent      Mood: calm      Level of consciousness:  Alert      Response to Learning: Able to verbalize current knowledge/experience      Endings: None Reported    Modes of Intervention: Education      Discipline Responsible: /Counselor      Signature:  RUTH Love LSW

## 2022-01-17 NOTE — PROGRESS NOTES
Pt. declined to attend the 0900 community meeting, despite staff encouragement.  Goal - \"Talk to the Doctor about my meds\" Electronically signed by Harinder Lucero, [address] on 1/17/2022 at 11:27 AM

## 2022-01-17 NOTE — PROGRESS NOTES
[address] FOLLOW-UP NOTE       1/17/2022     Patient was seen and examined in person, Chart reviewed   Patient's case discussed with staff/team    Chief Complaint: Depression AH    Interim History:     Pt continue to hear AH same content as before  Less depressed  Less intense hopeless and worthless feeling  Pt is not on any medication  Mood swings and anxious  Poor motivation to seek help for addiction  Appetite:   [] Normal/Unchanged  [] Increased  [x] Decreased      Sleep:       [] Normal/Unchanged  [] Fair       [x] Poor              Energy:    [] Normal/Unchanged  [] Increased  [x] Decreased        SI [x] Present  [] Absent    HI  []Present  [x] Absent     Aggression:  [] yes  [] no    Patient is [] able  [x] unable to CONTRACT FOR SAFETY     PAST MEDICAL/PSYCHIATRIC HISTORY:   Past Medical History:   Diagnosis Date    Depression     Neuropathy        FAMILY/SOCIAL HISTORY:  History reviewed. No pertinent family history.   Social History     Socioeconomic History    Marital status: Single     Spouse name: Not on file    Number of children: Not on file    Years of education: Not on file    Highest education level: Not on file   Occupational History    Not on file   Tobacco Use    Smoking status: Current Some Day Smoker     Packs/day: 0.50     Years: 18.00     Pack years: 9.00     Types: Cigarettes    Smokeless tobacco: Never Used   Vaping Use    Vaping Use: Former   Substance and Sexual Activity    Alcohol use: Yes     Comment: A 5th a day for the past year    Drug use: Not Currently     Comment: sober from cocaine for 5 weeks    Sexual activity: Not Currently   Other Topics Concern    Not on file   Social History Narrative    Not on file     Social Determinants of Health     Financial Resource Strain:     Difficulty of Paying Living Expenses: Not on file   Food Insecurity:     Worried About [address] in the Last Year: Not on file    [employer] in the Last Year: Not on file   Transportation Needs:     Lack of Transportation (Medical): Not on file    Lack of Transportation (Non-Medical): Not on file   Physical Activity:     Days of Exercise per Week: Not on file    Minutes of Exercise per Session: Not on file   Stress:     Feeling of Stress : Not on file   Social Connections:     Frequency of Communication with Friends and Family: Not on file    Frequency of Social Gatherings with Friends and Family: Not on file    Attends Anabaptism Services: Not on file    Active Member of [address] [place] [address] [employer] or Organizations: Not on file    Attends Club or Organization Meetings: Not on file    Marital Status: Not on file   Intimate Partner Violence:     Fear of Current or Ex-Partner: Not on file    Emotionally Abused: Not on file    Physically Abused: Not on file    Sexually Abused: Not on file   Housing Stability:     Unable to Pay for Housing in the Last Year: Not on file    Number of [place] in the Last Year: Not on file    Unstable Housing in the Last Year: Not on file           ROS:  [x] All negative/unchanged except if checked.  Explain positive(checked items) below:  [] Constitutional  [] Eyes  [] Ear/Nose/Mouth/Throat  [] Respiratory  [] CV  [] GI  []   [] Musculoskeletal  [] Skin/Breast  [] Neurological  [] Endocrine  [] Heme/Lymph  [] Allergic/Immunologic    Explanation:     MEDICATIONS:    Current Facility-Administered Medications:     cariprazine hcl (VRAYLAR) capsule 1.5 mg, 1.5 mg, Oral, Daily, Eddie Haque MD, 1.5 mg at 01/17/22 1220    0.9 % sodium chloride bolus, 2,000 mL, IntraVENous, Once, Conchita Vega PA-C    albuterol sulfate  (90 Base) MCG/ACT inhaler 2 puff, 2 puff, Inhalation, Q4H PRN, Eddie Haque MD    buprenorphine-naloxone (SUBOXONE) 8-2 MG SL tablet 1 tablet, 1 tablet, SubLINGual, BID, Eddie Haque MD, 1 tablet at 01/17/22 0938    cloNIDine (CATAPRES) tablet 0.1 mg, 0.1 mg, Oral, BID PRN, Vikas Clemente Moise Neff MD, 0.1 mg at 01/14/22 1648    traZODone (DESYREL) tablet 100 mg, 100 mg, Oral, Nightly PRN, Brandon Wilson MD, 100 mg at 01/16/22 2111    gabapentin (NEURONTIN) capsule 400 mg, 400 mg, Oral, TID, Brandon Wilson MD, 400 mg at 01/17/22 1357    acetaminophen (TYLENOL) tablet 650 mg, 650 mg, Oral, Q4H PRN, Brandon Wilson MD, 650 mg at 01/14/22 1648    magnesium hydroxide (MILK OF MAGNESIA) 400 MG/5ML suspension 30 mL, 30 mL, Oral, Daily PRN, Brandon Wilson MD    aluminum & magnesium hydroxide-simethicone (MAALOX) 200-200-20 MG/5ML suspension 30 mL, 30 mL, Oral, PRN, Brandon Wilson MD    haloperidol (HALDOL) tablet 5 mg, 5 mg, Oral, Q6H PRN, 5 mg at 01/17/22 1056 **OR** haloperidol lactate (HALDOL) injection 5 mg, 5 mg, IntraMUSCular, Q6H PRN, Brandon Wilson MD    benztropine mesylate (COGENTIN) injection 2 mg, 2 mg, IntraMUSCular, BID PRN, Brandon Wilson MD    hydrOXYzine (VISTARIL) injection 50 mg, 50 mg, IntraMUSCular, Q6H PRN **OR** hydrOXYzine (VISTARIL) capsule 50 mg, 50 mg, Oral, Q6H PRN, Brandon Wilson MD, 50 mg at 01/16/22 1732    chlordiazePOXIDE (LIBRIUM) capsule 10 mg, 10 mg, Oral, Q4H PRN, 10 mg at 01/16/22 1454 **OR** chlordiazePOXIDE (LIBRIUM) capsule 25 mg, 25 mg, Oral, Q4H PRN, 25 mg at 01/15/22 1825 **OR** chlordiazePOXIDE (LIBRIUM) capsule 50 mg, 50 mg, Oral, Q2H PRN **OR** chlordiazePOXIDE (LIBRIUM) capsule 75 mg, 75 mg, Oral, Q1H PRN **OR** LORazepam (ATIVAN) tablet 4 mg, 4 mg, Oral, Q1H PRN **OR** LORazepam (ATIVAN) injection 4 mg, 4 mg, IntraVENous, Q1H PRN, Brandon Wilson MD    thiamine tablet 100 mg, 100 mg, Oral, Daily, Brandon Wilson MD, 100 mg at 01/17/22 2270    multivitamin 1 tablet, 1 tablet, Oral, Daily, Brandon Wilson MD, 1 tablet at 87/95/59 5173    folic acid (FOLVITE) tablet 1 mg, 1 mg, Oral, Daily, Brandon Wilson MD, 1 mg at 01/17/22 6039    influenza quadrivalent split vaccine (FLUZONE;FLUARIX;FLULAVAL;AFLURIA) injection 0.5 mL, 0.5 mL, IntraMUSCular, Prior to discharge, Marquis Ann MD      Examination:  /86   Pulse 91   Temp 98.4 °F (36.9 °C)   Resp 20   Ht 5' 11\" (1.803 m)   Wt 176 lb (79.8 kg)   SpO2 98%   BMI 24.55 kg/m²   Gait - steady  Medication side effects(SE): no    Mental Status Examination:    Level of consciousness:  within normal limits   Appearance:  fair grooming and fair hygiene  Behavior/Motor:  psychomotor retardation  Attitude toward examiner:  attentive  Speech:  slow   Mood: depressed  Affect:  blunted  Thought processes:  slow   Thought content:  Perceptual Disturbance:  auditory  Cognition:  oriented to person, place, and time   Concentration poor  Insight poor   Judgement poor     ASSESSMENT:   Patient symptoms are:  [] Well controlled  [] Improving  [] Worsening  [] No change      Diagnosis:   Principal Problem:    Bipolar [facility] ([facility]  Resolved Problems:    * No resolved hospital problems. *      LABS:    No results for input(s): WBC, HGB, PLT in the last 72 hours. No results for input(s): NA, K, CL, CO2, BUN, CREATININE, GLUCOSE in the last 72 hours. No results for input(s): BILITOT, ALKPHOS, AST, ALT in the last 72 hours. Lab Results   Component Value Date    LABAMPH Neg 01/13/2022    BARBSCNU Neg 01/13/2022    LABBENZ Neg 01/13/2022    LABMETH Neg 01/13/2022    OPIATESCREENURINE Neg 01/13/2022    PHENCYCLIDINESCREENURINE Neg 01/13/2022    ETOH 32 01/13/2022     Lab Results   Component Value Date    TSH 2.330 01/13/2022       Treatment Plan:  Reviewed current Medications with the patient. vraylar jaky  Risks, benefits, side effects, drug-to-drug interactions and alternatives to treatment were discussed. Collateral information:   CD evaluation  Encourage patient to attend group and other milieu activities.   Discharge planning discussed with the patient and treatment team.    PSYCHOTHERAPY/COUNSELING:  [x] Therapeutic interview  [x] Supportive  [] CBT  [] Ongoing  [] Other    [x] Patient continues to need, on a daily basis, active treatment furnished directly by or requiring the supervision of inpatient psychiatric personnel      Anticipated Length of stay            Electronically signed by Mariluz Vital MD on 1/17/2022 at 4:53 PM

## 2022-01-17 NOTE — GROUP NOTE
Group Therapy Note    Date: 1/16/2022    Group Start Time: 2000  Group End Time: 2045  Group Topic: Recreational    [facility]    Kaitlin Schmid RN    To select an activity as a group- patients opted to watch a movie. Patients who chose to participate then agreed upon a movie to watch together. Group Therapy Note    Attendees: 10/18             Notes:  Pt did not attend group despite encouragement from staff.        Signature:  Kaitlin Schmid RN

## 2022-01-18 PROCEDURE — 6370000000 HC RX 637 (ALT 250 FOR IP): Performed by: PSYCHIATRY & NEUROLOGY

## 2022-01-18 PROCEDURE — 99232 SBSQ HOSP IP/OBS MODERATE 35: CPT | Performed by: PSYCHIATRY & NEUROLOGY

## 2022-01-18 PROCEDURE — 6360000002 HC RX W HCPCS: Performed by: PSYCHIATRY & NEUROLOGY

## 2022-01-18 PROCEDURE — 1240000000 HC EMOTIONAL WELLNESS R&B

## 2022-01-18 RX ADMIN — CARIPRAZINE 1.5 MG: 1.5 CAPSULE, GELATIN COATED ORAL at 09:34

## 2022-01-18 RX ADMIN — GABAPENTIN 400 MG: 400 CAPSULE ORAL at 13:52

## 2022-01-18 RX ADMIN — HYDROXYZINE PAMOATE 50 MG: 50 CAPSULE ORAL at 17:09

## 2022-01-18 RX ADMIN — THERA TABS 1 TABLET: TAB at 09:34

## 2022-01-18 RX ADMIN — BUPRENORPHINE HYDROCHLORIDE AND NALOXONE HYDROCHLORIDE DIHYDRATE 1 TABLET: 8; 2 TABLET SUBLINGUAL at 09:34

## 2022-01-18 RX ADMIN — BUPRENORPHINE HYDROCHLORIDE AND NALOXONE HYDROCHLORIDE DIHYDRATE 1 TABLET: 8; 2 TABLET SUBLINGUAL at 20:39

## 2022-01-18 RX ADMIN — GABAPENTIN 400 MG: 400 CAPSULE ORAL at 09:34

## 2022-01-18 RX ADMIN — FOLIC ACID 1 MG: 1 TABLET ORAL at 09:34

## 2022-01-18 RX ADMIN — Medication 100 MG: at 09:34

## 2022-01-18 RX ADMIN — HALOPERIDOL 5 MG: 5 TABLET ORAL at 18:58

## 2022-01-18 RX ADMIN — GABAPENTIN 400 MG: 400 CAPSULE ORAL at 20:39

## 2022-01-18 NOTE — PROGRESS NOTES
Pt. refused to attend the 1000 skills group, despite staff encouragement. Electronically signed by Akila Montero, [address] on 1/18/2022 at 11:00 AM

## 2022-01-18 NOTE — PROGRESS NOTES
Assessment done. Pt is calm and cooperative. Pt denies all. Denies depression and states anxiety of 2. Pt states that his medication change is helping him and he has not heard voices since last night / evening. Pt denies any nausea, diarrhea or discomfort at this time. Denies needs at this time.

## 2022-01-18 NOTE — PROGRESS NOTES
Pt. declined to attend the 0900 community meeting, despite staff encouragement.  Goal - \"Work on going home\" Electronically signed by Chanel Mendez, [address] on 1/18/2022 at 9:44 AM

## 2022-01-18 NOTE — GROUP NOTE
Group Therapy Note    Date: 1/18/2022    Group Start Time: 1100  Group End Time: 2350  Group Topic: Psychoeducation    [facility]    RUTH Firas LSW        Group Therapy Note    Attendees: 10         Patient's Goal:  To participate in a psychoeducational group therapy    Notes:  Patient participated in \"check in\" and was supportive of other group members    Status After Intervention:  Improved    Participation Level: Interactive    Participation Quality: Sharing      Speech:  normal      Thought Process/Content: Logical      Affective Functioning: Congruent      Mood: calm      Level of consciousness:  Alert      Response to Learning: Able to verbalize current knowledge/experience      Endings: None Reported    Modes of Intervention: Education      Discipline Responsible: /Counselor      Signature:  RUTH Frias LSW

## 2022-01-18 NOTE — GROUP NOTE
Group Therapy Note    Date: 1/18/2022    Group Start Time: 1400  Group End Time: 1450  Group Topic: Psychoeducation    [facility]    RUTH Sanford LSW        Group Therapy Note    Attendees: 10         Patient's Goal:  To participate in a psychoeducational group therapy    Notes:  Patient participated in [facility]\" group discussion    Status After Intervention:  Improved    Participation Level: Interactive    Participation Quality: Sharing      Speech:  normal      Thought Process/Content: Logical      Affective Functioning: Congruent      Mood: calm      Level of consciousness:  Alert      Response to Learning: Able to verbalize current knowledge/experience      Endings: None Reported    Modes of Intervention: Education      Discipline Responsible: /Counselor      Signature:  RUTH Sanford LSW

## 2022-01-18 NOTE — PROGRESS NOTES
[facility]. FOLLOW-UP NOTE       1/18/2022     Patient was seen and examined in person, Chart reviewed   Patient's case discussed with staff/team    Chief Complaint: Depression AH    Interim History:     Pt is not hearing voices today  Mary Zuniga yesterday  Slept better without any dreams  Spoke with his grandfather yesterday, willing to take him home  Pt has addiction to alcohol and opiate  On suboxone program.   Pt gave permission to talk to his grandfather  Denies feeling paranoid  Willing to attend OP program  Appetite:   [] Normal/Unchanged  [] Increased  [x] Decreased      Sleep:       [] Normal/Unchanged  [x] Fair       [] Poor              Energy:    [x] Normal/Unchanged  [] Increased  [] Decreased        SI [] Present  [x] Absent    HI  []Present  [x] Absent     Aggression:  [] yes  [] no    Patient is [] able  [x] unable to CONTRACT FOR SAFETY     PAST MEDICAL/PSYCHIATRIC HISTORY:   Past Medical History:   Diagnosis Date    Depression     Neuropathy        FAMILY/SOCIAL HISTORY:  History reviewed. No pertinent family history.   Social History     Socioeconomic History    Marital status: Single     Spouse name: Not on file    Number of children: Not on file    Years of education: Not on file    Highest education level: Not on file   Occupational History    Not on file   Tobacco Use    Smoking status: Current Some Day Smoker     Packs/day: 0.50     Years: 18.00     Pack years: 9.00     Types: Cigarettes    Smokeless tobacco: Never Used   Vaping Use    Vaping Use: Former   Substance and Sexual Activity    Alcohol use: Yes     Comment: A 5th a day for the past year    Drug use: Not Currently     Comment: sober from cocaine for 5 weeks    Sexual activity: Not Currently   Other Topics Concern    Not on file   Social History Narrative    Not on file     Social Determinants of Health     Financial Resource Strain:     Difficulty of Paying Living Expenses: Not on file   Food Insecurity:     Worried About [facility] in the Last Year: Not on file    [place] of Food in the Last Year: Not on file   Transportation Needs:     Lack of Transportation (Medical): Not on file    Lack of Transportation (Non-Medical): Not on file   Physical Activity:     Days of Exercise per Week: Not on file    Minutes of Exercise per Session: Not on file   Stress:     Feeling of Stress : Not on file   Social Connections:     Frequency of Communication with Friends and Family: Not on file    Frequency of Social Gatherings with Friends and Family: Not on file    Attends Scientologist Services: Not on file    Active Member of [address] [place] [address] or Organizations: Not on file    Attends Club or Organization Meetings: Not on file    Marital Status: Not on file   Intimate Partner Violence:     Fear of Current or Ex-Partner: Not on file    Emotionally Abused: Not on file    Physically Abused: Not on file    Sexually Abused: Not on file   Housing Stability:     Unable to Pay for Housing in the Last Year: Not on file    Number of [place] in the Last Year: Not on file    Unstable Housing in the Last Year: Not on file           ROS:  [x] All negative/unchanged except if checked.  Explain positive(checked items) below:  [] Constitutional  [] Eyes  [] Ear/Nose/Mouth/Throat  [] Respiratory  [] CV  [] GI  []   [] Musculoskeletal  [] Skin/Breast  [] Neurological  [] Endocrine  [] Heme/Lymph  [] Allergic/Immunologic    Explanation:     MEDICATIONS:    Current Facility-Administered Medications:     cariprazine hcl (VRAYLAR) capsule 1.5 mg, 1.5 mg, Oral, Daily, Kathy Kohler MD, 1.5 mg at 01/18/22 0934    0.9 % sodium chloride bolus, 2,000 mL, IntraVENous, Once, Radha Kelly PA-C    albuterol sulfate  (90 Base) MCG/ACT inhaler 2 puff, 2 puff, Inhalation, Q4H PRN, Kathy Kohler MD    buprenorphine-naloxone (SUBOXONE) 8-2 MG SL tablet 1 tablet, 1 tablet, SubLINGual, BID, Verellena Conn Ford Damon MD, 1 tablet at 01/18/22 0934    cloNIDine (CATAPRES) tablet 0.1 mg, 0.1 mg, Oral, BID PRN, Sanam Ewing MD, 0.1 mg at 01/14/22 1648    traZODone (DESYREL) tablet 100 mg, 100 mg, Oral, Nightly PRN, Sanam Ewing MD, 100 mg at 01/16/22 2111    gabapentin (NEURONTIN) capsule 400 mg, 400 mg, Oral, TID, Sanam Ewing MD, 400 mg at 01/18/22 0934    acetaminophen (TYLENOL) tablet 650 mg, 650 mg, Oral, Q4H PRN, Sanam Ewing MD, 650 mg at 01/14/22 1648    magnesium hydroxide (MILK OF MAGNESIA) 400 MG/5ML suspension 30 mL, 30 mL, Oral, Daily PRN, Sanam Ewing MD    aluminum & magnesium hydroxide-simethicone (MAALOX) 200-200-20 MG/5ML suspension 30 mL, 30 mL, Oral, PRN, Sanam Ewing MD    haloperidol (HALDOL) tablet 5 mg, 5 mg, Oral, Q6H PRN, 5 mg at 01/17/22 1056 **OR** haloperidol lactate (HALDOL) injection 5 mg, 5 mg, IntraMUSCular, Q6H PRN, Sanam Ewing MD    benztropine mesylate (COGENTIN) injection 2 mg, 2 mg, IntraMUSCular, BID PRN, Sanam Ewing MD    hydrOXYzine (VISTARIL) injection 50 mg, 50 mg, IntraMUSCular, Q6H PRN **OR** hydrOXYzine (VISTARIL) capsule 50 mg, 50 mg, Oral, Q6H PRN, Sanam Ewing MD, 50 mg at 01/16/22 1732    [DISCONTINUED] chlordiazePOXIDE (LIBRIUM) capsule 10 mg, 10 mg, Oral, Q4H PRN, 10 mg at 01/16/22 1454 **OR** [DISCONTINUED] chlordiazePOXIDE (LIBRIUM) capsule 25 mg, 25 mg, Oral, Q4H PRN, 25 mg at 01/15/22 1825 **OR** [DISCONTINUED] chlordiazePOXIDE (LIBRIUM) capsule 50 mg, 50 mg, Oral, Q2H PRN **OR** [DISCONTINUED] chlordiazePOXIDE (LIBRIUM) capsule 75 mg, 75 mg, Oral, Q1H PRN **OR** LORazepam (ATIVAN) tablet 4 mg, 4 mg, Oral, Q1H PRN **OR** LORazepam (ATIVAN) injection 4 mg, 4 mg, IntraVENous, Q1H PRN, Sanam Ewing MD    thiamine tablet 100 mg, 100 mg, Oral, Daily, Sanam Ewing MD, 100 mg at 01/18/22 6377    multivitamin 1 tablet, 1 tablet, Oral, Daily, Sanam Ewing MD, 1 tablet at 91/23/34 3635    folic acid (FOLVITE) tablet 1 mg, 1 mg, Oral, Daily, Bebe Solorzano MD, 1 mg at 01/18/22 0934    influenza quadrivalent split vaccine (FLUZONE;FLUARIX;FLULAVAL;AFLURIA) injection 0.5 mL, 0.5 mL, IntraMUSCular, Prior to discharge, Bebe Solorzano MD      Examination:  /82   Pulse 103   Temp 98.2 °F (36.8 °C) (Oral)   Resp 18   Ht 5' 11\" (1.803 m)   Wt 176 lb (79.8 kg)   SpO2 97%   BMI 24.55 kg/m²   Gait - steady  Medication side effects(SE): no    Mental Status Examination:    Level of consciousness:  within normal limits   Appearance:  fair grooming and fair hygiene  Behavior/Motor:  psychomotor retardation  Attitude toward examiner:  attentive  Speech:  slow   Mood: depressed  Affect:  blunted  Thought processes:  slow   Thought content:  Perceptual Disturbance:denies  auditory  Cognition:  oriented to person, place, and time   Concentration poor  Insight poor   Judgement poor     ASSESSMENT:   Patient symptoms are:  [] Well controlled  [x] Improving  [] Worsening  [] No change      Diagnosis:   Principal Problem:    [facility]  Resolved Problems:    * No resolved hospital problems. *      LABS:    No results for input(s): WBC, HGB, PLT in the last 72 hours. No results for input(s): NA, K, CL, CO2, BUN, CREATININE, GLUCOSE in the last 72 hours. No results for input(s): BILITOT, ALKPHOS, AST, ALT in the last 72 hours. Lab Results   Component Value Date    LABAMPH Neg 01/13/2022    BARBSCNU Neg 01/13/2022    LABBENZ Neg 01/13/2022    LABMETH Neg 01/13/2022    OPIATESCREENURINE Neg 01/13/2022    PHENCYCLIDINESCREENURINE Neg 01/13/2022    ETOH 32 01/13/2022     Lab Results   Component Value Date    TSH 2.330 01/13/2022       Treatment Plan:  Reviewed current Medications with the patient. Increase vraylar tomorrow  Risks, benefits, side effects, drug-to-drug interactions and alternatives to treatment were discussed.   Collateral information: from grandfather  CD evaluation  Encourage patient to attend group and other milieu activities.   Discharge planning discussed with the patient and treatment team.    PSYCHOTHERAPY/COUNSELING:  [x] Therapeutic interview  [x] Supportive  [] CBT  [] Ongoing  [] Other    [x] Patient continues to need, on a daily basis, active treatment furnished directly by or requiring the supervision of inpatient psychiatric personnel      Anticipated Length of stay- 2 days            Electronically signed by Lyssa Allen MD on 1/18/2022 at 11:43 AM

## 2022-01-18 NOTE — PROGRESS NOTES
Pt awaken for 2pm Neurontin 400 mg , then encouraged to attend next group starting any time now, pt was agreeable,

## 2022-01-18 NOTE — GROUP NOTE
Group Therapy Note    Date: 1/18/2022    Group Start Time: 8921  Group End Time: 1283  Group Topic: Healthy Living/Wellness    [facility]    Victoria Mosley        Group Therapy Note    Attendees: 7/20         Patient's Goal:  To learn about nutrition and healthy eating. Notes:  Patient participated in group discussion. Paitent seemed very tired.     Status After Intervention:  Unchanged    Participation Level: Interactive    Participation Quality: Appropriate, Attentive and Lethargic      Speech:  normal      Thought Process/Content: Logical      Affective Functioning: Flat      Mood: euthymic      Level of consciousness:  Alert, Attentive and Drowsy      Response to Learning: Progressing to goal      Endings: None Reported    Modes of Intervention: Education      Discipline Responsible: [address], [employer]      Signature:  Victoria Mosley

## 2022-01-18 NOTE — GROUP NOTE
Group Therapy Note    Date: 1/17/2022    Group Start Time: 2045  Group End Time: 2100  Group Topic: Wrap-Up    [facility] JULIANA Macias Notice        Group Therapy Note    Attendees: 8/20         Patient's Goal:  \"get on the proper medication\"    Notes:  Patient reported meeting their goal for the day. Patient is happy he did not hear voices today.     Status After Intervention:  Unchanged    Participation Level: Interactive    Participation Quality: Appropriate, Attentive and Sharing      Speech:  normal      Thought Process/Content: Logical      Affective Functioning: Congruent      Mood: euthymic      Level of consciousness:  Alert and Attentive      Response to Learning: Progressing to goal      Endings: None Reported    Modes of Intervention: Support      Discipline Responsible: [employer]      Signature:  Colleen Geronimo

## 2022-01-18 NOTE — PROGRESS NOTES
Pt is sleeping in bed awaken for am meds, all am meds were explained and given, Neurontin 400 mg , subox sl , stayed with pt until dissolved, pt reports feeling better, stated the newly ordered Elma Achilles has helped with the voices, no tremors or sweats noted.  Pt reports eating breakfast this am.

## 2022-01-18 NOTE — PROGRESS NOTES
Patient did not attend group despite staff encouragement.   Electronically signed by Antelmo Archer on 1/17/2022 at 9:12 PM

## 2022-01-19 PROCEDURE — 99232 SBSQ HOSP IP/OBS MODERATE 35: CPT | Performed by: PSYCHIATRY & NEUROLOGY

## 2022-01-19 PROCEDURE — 86147 CARDIOLIPIN ANTIBODY EA IG: CPT

## 2022-01-19 PROCEDURE — 6360000002 HC RX W HCPCS: Performed by: PSYCHIATRY & NEUROLOGY

## 2022-01-19 PROCEDURE — 6370000000 HC RX 637 (ALT 250 FOR IP): Performed by: PSYCHIATRY & NEUROLOGY

## 2022-01-19 PROCEDURE — 87389 HIV-1 AG W/HIV-1&-2 AB AG IA: CPT

## 2022-01-19 PROCEDURE — 1240000000 HC EMOTIONAL WELLNESS R&B

## 2022-01-19 PROCEDURE — 90833 PSYTX W PT W E/M 30 MIN: CPT | Performed by: PSYCHIATRY & NEUROLOGY

## 2022-01-19 RX ADMIN — TRAZODONE HYDROCHLORIDE 100 MG: 100 TABLET ORAL at 20:17

## 2022-01-19 RX ADMIN — GABAPENTIN 400 MG: 400 CAPSULE ORAL at 08:46

## 2022-01-19 RX ADMIN — CARIPRAZINE 1.5 MG: 1.5 CAPSULE, GELATIN COATED ORAL at 08:46

## 2022-01-19 RX ADMIN — THERA TABS 1 TABLET: TAB at 08:46

## 2022-01-19 RX ADMIN — FOLIC ACID 1 MG: 1 TABLET ORAL at 08:46

## 2022-01-19 RX ADMIN — GABAPENTIN 400 MG: 400 CAPSULE ORAL at 20:17

## 2022-01-19 RX ADMIN — Medication 100 MG: at 08:46

## 2022-01-19 RX ADMIN — HALOPERIDOL 5 MG: 5 TABLET ORAL at 19:53

## 2022-01-19 RX ADMIN — GABAPENTIN 400 MG: 400 CAPSULE ORAL at 14:04

## 2022-01-19 RX ADMIN — BUPRENORPHINE HYDROCHLORIDE AND NALOXONE HYDROCHLORIDE DIHYDRATE 1 TABLET: 8; 2 TABLET SUBLINGUAL at 08:46

## 2022-01-19 RX ADMIN — BUPRENORPHINE HYDROCHLORIDE AND NALOXONE HYDROCHLORIDE DIHYDRATE 1 TABLET: 8; 2 TABLET SUBLINGUAL at 20:17

## 2022-01-19 RX ADMIN — HYDROXYZINE PAMOATE 50 MG: 50 CAPSULE ORAL at 17:04

## 2022-01-19 NOTE — PROGRESS NOTES
Pt. refused to attend the 1000 skills group, despite staff encouragement.  Electronically signed by Catana Fleischer on 1/19/2022 at 12:03 PM

## 2022-01-19 NOTE — PROGRESS NOTES
Pt out on unit, but not social with peers. Pt voiced auditory hallucinations, request, Haldol voiced increased agitation, medicated per request. Pt encouraged coping skills, listening to music. Pt reports showering today. Pt reports good appetite. Pt reports good sleep. Pt denies anxiety, depression, Pt voiced Haldol previously effectiveness, decrease auditory hallucinations. Pt denies SI, HI and A/V hallucinations. Will continue to monitor.

## 2022-01-19 NOTE — PROGRESS NOTES
Pt is noted out on the unit, ate good breakfast, pt is brightr,explained and gave am meds Neurontin 400 mg and subox sl until dissolved .

## 2022-01-19 NOTE — PROGRESS NOTES
PRN Vistaril appears to have decreased pt anxiety. Pt denies any SI, HI, AVH. Pt ate dinner, social with select peers.

## 2022-01-19 NOTE — PROGRESS NOTES
Morning Community Meeting Topics    [place] attended the morning community meeting on 1/19/22. Topics discussed today     [x] Introduction   Day of the week and date   Mask distribution   Current mask requirements  [x]Teams   Explanation of  Green and Blue team criteria   Nurses assigned to each team for today   Explanation about green and blue paper  o Date  o Patient's Name  o Patient's Nurse  o Goals  [x] Visitation   Announce the visiting hours for the day   Announce which team is allowed to have visitors for the day   Review any updated Covid 19 requirements for visitors during visitation  o Vaccine Card or negative Covid test within 48 hours of visit  o State Identification   Patients are reminded to alert the  at least 1 hour before visitation   [x] Unit Orientation   Coffee use   Phone location and etiquette   Shower locations  [employer] and dryer location and process   Common area expectations   Staff rounds expectation  [x] Meals    Educate patient to the menu  o The patient is encouraged to fill out the menu to get preferences at mealtime  o The patient is educated that if they do not fill out the menu, they will get the standard tray  o The coffee pot is decaf, patient encouraged to order regular coffee from menu.    Educate patient to the meal process   Patient encouraged to eat snacks provided twice daily  o Snacks may stay in patient room     [x] Discharge Process   Discharge expectations   Fill out the survey after discharge   [x] Hygiene   Daily showers encouraged  o Showers availability discussed    Daily dressing encouraged  o Discussed wearing street clothing   Education provided on where to place linens and clothing  o Linens in the hamper  o personal clothing does not go into the linen hamper  [x] Group    Patient encouraged to attend group provided   Time of Group Meetings discussed   Gentle reminder that attendance is a Physician order  [x] Movement   Chair exercises completed   Stretching completed  Notes:  GOAL : \" to work with the doctor and make sure meds are good\" Electronically signed by Gerson Murrieta on 1/19/2022 at 11:54 AM

## 2022-01-19 NOTE — CARE COORDINATION
FAMILY COLLATERAL NOTE    Family/Support Name: Audrey Rider  Contact #:[phone]  Relationship to Pt[de-identified] grandfather        Family/Support contact aware of hospitalization:  Presenting Symptoms/Current Concerns:Said he spoke to patient briefly and patient said he was placed on a medication that he has been trying to be placed on awhile ago. Patient told grandfather he was hearing voices and grandfather took him to the [facility]. Patient had been sleeping and was so weak and barely wanted to get out of bed. Grandfather thinks the med change 2.5 weeks ago from his NP triggered this hospitalization. Patient is very helpful to his grandfather and does all the cooking and is like a . Top 3 Life Stressors:   symptoms      Background History Relevant to Current Hospitalization:    Patient is very talented and educated per grandfather  Patients father started patient on drugs when patient was 14. Family Mental Health/Substance Use History:     Mental illness is on patients father's side of the family. Father was Bipolar and OD     Support Network's Goal for Hospitalization: to stabilize on correct meds    Discharge Plan: go to [facility] after discharge and then back home to grandfathers home.        Support Network Supportive of Discharge Plan: yes      Support can confirm Safety of Location and Security of Weapons:   No weapons in the home    Support agreeable to Safeguard and Monitor Medications (including Prescription and OTC): will use a pill box and lock up medications    Identified Barriers to Compliance with Discharge Plan: none    Recommendations for Support Network:   Call mercy if any questions      Giulia Mcgee, [facility]

## 2022-01-19 NOTE — GROUP NOTE
Group Therapy Note    Date: 1/19/2022    Group Start Time: 2514  Group End Time: 1440  Group Topic: Psychoeducation    [facility]    JAIME Neal        Group Therapy Note    Attendees: 7         Patient's Goal:  To participate in [facility] Psychoeducational group exercise. Notes:  Patient talked about his shiatsu named Natalio. Status After Intervention:  Improved    Participation Level:  Active Listener    Participation Quality: Appropriate      Speech:  normal      Thought Process/Content: Logical      Affective Functioning: Congruent      Mood: Calm      Level of consciousness:  Alert and Attentive      Response to Learning: Capable of insight      Endings: None Reported    Modes of Intervention: Education      Discipline Responsible: /Counselor      Signature:  JAIME Neal

## 2022-01-19 NOTE — PROGRESS NOTES
Patient did not attend group despite staff encouragement.   Electronically signed by Joan Tobias on 1/18/2022 at 9:47 PM

## 2022-01-19 NOTE — PROGRESS NOTES
Pt visible  On unit today . Seen having meals with select peers. Reports hearing audible voices which are negative and condescending. Denies depression, anxiety at 3/10, denies SI, HI. Attending a few groups and reports good sleep yesterday. Currently visible in day room.

## 2022-01-19 NOTE — PROGRESS NOTES
 Ran Out of Food in the Last Year: Not on file   Transportation Needs:     Lack of Transportation (Medical): Not on file    Lack of Transportation (Non-Medical): Not on file   Physical Activity:     Days of Exercise per Week: Not on file    Minutes of Exercise per Session: Not on file   Stress:     Feeling of Stress : Not on file   Social Connections:     Frequency of Communication with Friends and Family: Not on file    Frequency of Social Gatherings with Friends and Family: Not on file    Attends Advent Services: Not on file    Active Member of [address] [place] [address] [employer] or Organizations: Not on file    Attends Club or Organization Meetings: Not on file    Marital Status: Not on file   Intimate Partner Violence:     Fear of Current or Ex-Partner: Not on file    Emotionally Abused: Not on file    Physically Abused: Not on file    Sexually Abused: Not on file   Housing Stability:     Unable to Pay for Housing in the Last Year: Not on file    Number of [place] in the Last Year: Not on file    Unstable Housing in the Last Year: Not on file           ROS:  [x] All negative/unchanged except if checked.  Explain positive(checked items) below:  [] Constitutional  [] Eyes  [] Ear/Nose/Mouth/Throat  [] Respiratory  [] CV  [] GI  []   [] Musculoskeletal  [] Skin/Breast  [] Neurological  [] Endocrine  [] Heme/Lymph  [] Allergic/Immunologic    Explanation:     MEDICATIONS:    Current Facility-Administered Medications:     [START ON 1/20/2022] cariprazine hcl (VRAYLAR) capsule 3 mg, 3 mg, Oral, Daily, Crystal Goltz, MD    0.9 % sodium chloride bolus, 2,000 mL, IntraVENous, Once, Thanh Garcia PA-C    albuterol sulfate  (90 Base) MCG/ACT inhaler 2 puff, 2 puff, Inhalation, Q4H PRN, Crystal Goltz, MD    buprenorphine-naloxone (SUBOXONE) 8-2 MG SL tablet 1 tablet, 1 tablet, SubLINGual, BID, Crystal Goltz, MD, 1 tablet at 01/19/22 0846    cloNIDine (CATAPRES) tablet 0.1 mg, 0.1 mg, Oral, BID PRN, Makenzie Blair MD, 0.1 mg at 01/14/22 1648    traZODone (DESYREL) tablet 100 mg, 100 mg, Oral, Nightly PRN, Makenzie Blair MD, 100 mg at 01/16/22 2111    gabapentin (NEURONTIN) capsule 400 mg, 400 mg, Oral, TID, Makenzie Blair MD, 400 mg at 01/19/22 0846    acetaminophen (TYLENOL) tablet 650 mg, 650 mg, Oral, Q4H PRN, Makenzie Blair MD, 650 mg at 01/14/22 1648    magnesium hydroxide (MILK OF MAGNESIA) 400 MG/5ML suspension 30 mL, 30 mL, Oral, Daily PRN, Makenzie Blair MD    aluminum & magnesium hydroxide-simethicone (MAALOX) 200-200-20 MG/5ML suspension 30 mL, 30 mL, Oral, PRN, Makenzie Blair MD    haloperidol (HALDOL) tablet 5 mg, 5 mg, Oral, Q6H PRN, 5 mg at 01/18/22 1858 **OR** haloperidol lactate (HALDOL) injection 5 mg, 5 mg, IntraMUSCular, Q6H PRN, Makenzie Blair MD    benztropine mesylate (COGENTIN) injection 2 mg, 2 mg, IntraMUSCular, BID PRN, Makenzie Blair MD    hydrOXYzine (VISTARIL) injection 50 mg, 50 mg, IntraMUSCular, Q6H PRN **OR** hydrOXYzine (VISTARIL) capsule 50 mg, 50 mg, Oral, Q6H PRN, Makenzie Blair MD, 50 mg at 01/18/22 1709    [DISCONTINUED] chlordiazePOXIDE (LIBRIUM) capsule 10 mg, 10 mg, Oral, Q4H PRN, 10 mg at 01/16/22 1454 **OR** [DISCONTINUED] chlordiazePOXIDE (LIBRIUM) capsule 25 mg, 25 mg, Oral, Q4H PRN, 25 mg at 01/15/22 1825 **OR** [DISCONTINUED] chlordiazePOXIDE (LIBRIUM) capsule 50 mg, 50 mg, Oral, Q2H PRN **OR** [DISCONTINUED] chlordiazePOXIDE (LIBRIUM) capsule 75 mg, 75 mg, Oral, Q1H PRN **OR** LORazepam (ATIVAN) tablet 4 mg, 4 mg, Oral, Q1H PRN **OR** LORazepam (ATIVAN) injection 4 mg, 4 mg, IntraVENous, Q1H PRN, Makenzie Blair MD    thiamine tablet 100 mg, 100 mg, Oral, Daily, Makenzie Blair MD, 100 mg at 01/19/22 0846    multivitamin 1 tablet, 1 tablet, Oral, Daily, Makenzie Blair MD, 1 tablet at 78/56/42 4367    folic acid (FOLVITE) tablet 1 mg, 1 mg, Oral, Daily, Makenzie Blair MD, 1 mg at 01/19/22 0846    influenza quadrivalent split vaccine (FLUZONE;FLUARIX;FLULAVAL;AFLURIA) injection 0.5 mL, 0.5 mL, IntraMUSCular, Prior to discharge, Michael Armstrong MD      Examination:  BP (!) 112/90   Pulse 131   Temp 97.5 °F (36.4 °C)   Resp 18   Ht 5' 11\" (1.803 m)   Wt 176 lb (79.8 kg)   SpO2 96%   BMI 24.55 kg/m²   Gait - steady  Medication side effects(SE): no    Mental Status Examination:    Level of consciousness:  within normal limits   Appearance:  fair grooming and fair hygiene  Behavior/Motor:  Less psychomotor retardation  Attitude toward examiner:  attentive  Speech:  slow   Mood:less depressed  Affect:  blunted  Thought processes:  slow   Thought content:  Perceptual Disturbance:denies  Denies auditory  Cognition:  oriented to person, place, and time   Concentration better  Insight better  Judgement better    ASSESSMENT:   Patient symptoms are:  [] Well controlled  [x] Improving  [] Worsening  [] No change      Diagnosis:   Principal Problem:    [facility]  Resolved Problems:    * No resolved hospital problems. *      LABS:    No results for input(s): WBC, HGB, PLT in the last 72 hours. No results for input(s): NA, K, CL, CO2, BUN, CREATININE, GLUCOSE in the last 72 hours. No results for input(s): BILITOT, ALKPHOS, AST, ALT in the last 72 hours. Lab Results   Component Value Date    LABAMPH Neg 01/13/2022    BARBSCNU Neg 01/13/2022    LABBENZ Neg 01/13/2022    LABMETH Neg 01/13/2022    OPIATESCREENURINE Neg 01/13/2022    PHENCYCLIDINESCREENURINE Neg 01/13/2022    ETOH 32 01/13/2022     Lab Results   Component Value Date    TSH 2.330 01/13/2022       Treatment Plan:  Reviewed current Medications with the patient. Risks, benefits, side effects, drug-to-drug interactions and alternatives to treatment were discussed. Collateral information: from grandfather  CD evaluation  Encourage patient to attend group and other milieu activities.   Discharge planning discussed with the patient and treatment team.    PSYCHOTHERAPY/COUNSELING:  [x] Therapeutic interview  [x] Supportive  [] CBT  [] Ongoing  [] Other    Patient was seen 1:1 for 20 minutes, other than E&M time spent, focusing on      - coping skills techniques     - Anxiety management techniques discussed including deep breathing exercise and PMR     - discussing patients strength and weakness      - Motivational interviewing to assess the stage of change and assessing patient readiness to quit substance use.      - Focusing on negative cognition and maladaptive thoughts, which is feeding and maintaining the depression symptoms        [x] Patient continues to need, on a daily basis, active treatment furnished directly by or requiring the supervision of inpatient psychiatric personnel      Anticipated Length of stay- tomorrow            Electronically signed by Ina Talley MD on 1/19/2022 at 11:44 AM

## 2022-01-19 NOTE — PROGRESS NOTES
Pt reporting high anxiety. States anxiety 8/10, denies depression. Pt out, isolates to self. Requesting Vistaril for the anxiety. When this nurse went to give med pt verbalized wanting Haldol. Pt reminded of indications for medications, pt verbalized understanding. Medicated with PRN Vistaril.

## 2022-01-19 NOTE — PROGRESS NOTES
Patient did not attend group despite staff encouragement.   Electronically signed by Monica Hi on 1/18/2022 at 9:22 PM

## 2022-01-20 VITALS
TEMPERATURE: 98.2 F | RESPIRATION RATE: 18 BRPM | WEIGHT: 176 LBS | SYSTOLIC BLOOD PRESSURE: 112 MMHG | HEIGHT: 71 IN | DIASTOLIC BLOOD PRESSURE: 84 MMHG | BODY MASS INDEX: 24.64 KG/M2 | HEART RATE: 89 BPM | OXYGEN SATURATION: 97 %

## 2022-01-20 LAB — HIV AG/AB: NONREACTIVE

## 2022-01-20 PROCEDURE — 6360000002 HC RX W HCPCS: Performed by: PSYCHIATRY & NEUROLOGY

## 2022-01-20 PROCEDURE — 99239 HOSP IP/OBS DSCHRG MGMT >30: CPT | Performed by: PSYCHIATRY & NEUROLOGY

## 2022-01-20 PROCEDURE — 6370000000 HC RX 637 (ALT 250 FOR IP): Performed by: PSYCHIATRY & NEUROLOGY

## 2022-01-20 RX ORDER — TRAZODONE HYDROCHLORIDE 100 MG/1
100 TABLET ORAL NIGHTLY PRN
Qty: 15 TABLET | Refills: 2 | Status: SHIPPED | OUTPATIENT
Start: 2022-01-20

## 2022-01-20 RX ORDER — GABAPENTIN 400 MG/1
400 CAPSULE ORAL 3 TIMES DAILY
Qty: 30 CAPSULE | Refills: 0 | Status: SHIPPED | OUTPATIENT
Start: 2022-01-20 | End: 2022-01-30

## 2022-01-20 RX ORDER — MULTIVITAMIN WITH IRON
1 TABLET ORAL DAILY
Refills: 0 | COMMUNITY
Start: 2022-01-21

## 2022-01-20 RX ADMIN — THERA TABS 1 TABLET: TAB at 08:56

## 2022-01-20 RX ADMIN — GABAPENTIN 400 MG: 400 CAPSULE ORAL at 13:59

## 2022-01-20 RX ADMIN — CARIPRAZINE 3 MG: 1.5 CAPSULE, GELATIN COATED ORAL at 08:56

## 2022-01-20 RX ADMIN — BUPRENORPHINE HYDROCHLORIDE AND NALOXONE HYDROCHLORIDE DIHYDRATE 1 TABLET: 8; 2 TABLET SUBLINGUAL at 08:56

## 2022-01-20 RX ADMIN — FOLIC ACID 1 MG: 1 TABLET ORAL at 08:56

## 2022-01-20 RX ADMIN — GABAPENTIN 400 MG: 400 CAPSULE ORAL at 08:56

## 2022-01-20 RX ADMIN — MAGNESIUM HYDROXIDE 30 ML: 2400 SUSPENSION ORAL at 10:52

## 2022-01-20 RX ADMIN — Medication 100 MG: at 08:56

## 2022-01-20 NOTE — PROGRESS NOTES
Patient did not attend group despite staff encouragement.   Electronically signed by Jordan Adams on 1/19/2022 at 9:28 PM

## 2022-01-20 NOTE — PROGRESS NOTES
Patient did not attend group despite staff encouragement.   Electronically signed by Radha Gupta on 1/19/2022 at 9:40 PM

## 2022-01-20 NOTE — DISCHARGE INSTR - DIET

## 2022-01-20 NOTE — PROGRESS NOTES
CLINICAL PHARMACY NOTE: MEDS TO BEDS    Total # of Prescriptions Filled: 2   The following medications were delivered to the patient:  · Trazodone 100mg tab  · Gabapentin 400mg cap    Additional Documentation:

## 2022-01-20 NOTE — PROGRESS NOTES
Pt noted up on the unit , ate a good breakfast, explained and gave all am meds, pt reports the vrylar helps him to feel normal and is aware of increase, neuronin 400 mg given for his back injury from lifting weights, pt is brighter and more talkative today.

## 2022-01-20 NOTE — PROGRESS NOTES
Morning Community Meeting Topics    Kaden Helen attended the morning community meeting on 1/20/22. Topics discussed today     [x] Introduction   Day of the week and date   Mask distribution   Current mask requirements  [x]Teams   Explanation of  Green and Blue team criteria   Nurses assigned to each team for today   Explanation about green and blue paper  o Date  o Patient's Name  o Patient's Nurse  o Goals  [x] Visitation   Announce the visiting hours for the day   Announce which team is allowed to have visitors for the day   Review any updated Covid 19 requirements for visitors during visitation  o Vaccine Card or negative Covid test within 48 hours of visit  o State Identification   Patients are reminded to alert the  at least 1 hour before visitation   [x] Unit Orientation   Coffee use   Phone location and etiquette   Shower locations  [employer] and dryer location and process   Common area expectations   Staff rounds expectation  [x] Meals    Educate patient to the menu  o The patient is encouraged to fill out the menu to get preferences at mealtime  o The patient is educated that if they do not fill out the menu, they will get the standard tray  o The coffee pot is decaf, patient encouraged to order regular coffee from menu.    Educate patient to the meal process   Patient encouraged to eat snacks provided twice daily  o Snacks may stay in patient room     [x] Discharge Process   Discharge expectations   Fill out the survey after discharge   [x] Hygiene   Daily showers encouraged  o Showers availability discussed    Daily dressing encouraged  o Discussed wearing street clothing   Education provided on where to place linens and clothing  o Linens in the hamper  o personal clothing does not go into the linen hamper  [x] Group    Patient encouraged to attend group provided   Time of Group Meetings discussed   Gentle reminder that attendance is a Physician order  [x] Movement   Chair exercises completed   Stretching completed  Notes: GOAL :\" to go home\" Electronically signed by Zoe Bradford on 1/20/2022 at 12:29 PM

## 2022-01-20 NOTE — PROGRESS NOTES
Spiritual Support Group Note    Number of Participants in Group: 5                       Time: 15:00-15:45    Goal: Relief from isolation and loneliness             Sanjuanita Sharing             Self-understanding and gain insight              Acceptance and belonging            Recognize they are not alone                Socialization             Empowerment       Encouragement    Topic:  [x] Spiritual Wellness and Self Care                  [] Hope                     [x] Connecting with Divine/Others        [] Thankfulness and Gratitude               []  Meaningfulness and Purpose               [] Forgiveness               [] Peace               [] Connect to [employer]      [] Other    Participation Level:   [x] Active Listener   [] Minimal   [] Monopolizing   [x] Interactive   [] No Participation   []  Other:     Attention:   [x] Alert   [] Distractible   [] Drowsy   [] Poor   [] Other:    Manner:   [x] Cooperative   [] Suspicious   [] Withdrawn   [] Guarded   [] Irritable   [] Inhospitable   [] Other:     Others Comments from Group:
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
## 2022-01-20 NOTE — PROGRESS NOTES
Pt reporting high anxiety. Pt continues to hear voices, pt requesting HS medications so he can try to go to sleep. Pt pacing around halls. Pt reports he is very nervous about his future plans.

## 2022-01-20 NOTE — PROGRESS NOTES
Patient presents to nursing desk requesting medication for hearing voices, patient is shaking and slightly agitated due to the voices. Patient administered haldol 5mg PO per [facility], will continue to monitor for effectiveness.

## 2022-01-20 NOTE — GROUP NOTE
Group Therapy Note    Date: 1/20/2022    Group Start Time: 1100  Group End Time: 4237  Group Topic: Psychoeducation    [facility]    RUTH Ford LSW        Group Therapy Note    Attendees: 5         Patient's Goal:  To participate in a psychoeducational group therapy    Notes:  Patient participated in \"check in\". He is being discharged today and reports feeling like his \"old self\" again. He is feeling very hopeful and enjoyed his experience on the unit.      Status After Intervention:  Improved    Participation Level: Interactive    Participation Quality: Sharing      Speech:  normal      Thought Process/Content: Logical      Affective Functioning: Congruent      Mood: calm      Level of consciousness:  Alert      Response to Learning: Able to verbalize current knowledge/experience      Endings: None Reported    Modes of Intervention: Education      Discipline Responsible: /Counselor      Signature:  RUTH Ford, REFUGIO

## 2022-01-20 NOTE — GROUP NOTE
Group Therapy Note    Date: 1/20/2022    Group Start Time: 1000  Group End Time: 1100  Group Topic: Psychoeducation    [facility]    Merline Gill, CTRS        Group Therapy Note    Attendees: 7         Patient's Goal:  \"to go home\"    Notes:  Pt. attended the 1000 skill group. Pt. was relaxed and feeling better. Happy to be going home. Easily engaged in the group discussion. Status After Intervention:  Improved    Participation Level:  Active Listener and Interactive    Participation Quality: Appropriate, Attentive and Sharing      Speech:  normal      Thought Process/Content: Logical      Affective Functioning: Congruent      Mood: callm      Level of consciousness:  Alert, Oriented x4 and Attentive      Response to Learning: Progressing to goal      Endings: None Reported    Modes of Intervention: Education, Support, Socialization and Activity      Discipline Responsible: Psychoeducational Specialist      Signature:  Dominik Morris

## 2022-01-21 LAB
ANTICARDIOLIPIN IGG ANTIBODY: <10 GPL
CARDIOLIPIN AB IGM: 18 MPL

## 2025-01-31 NOTE — CARE COORDINATION
SPOKE WITH DR. Melissa Garcia TO , ALSO NOTED IN DR. Mellissa Arenas NOTE, CLEAR FOR . CALL PLACED TO [facility] ON [address] TO NOTIFY. WILL AWAIT BED PLACEMENT.
repositioning